# Patient Record
Sex: FEMALE | Race: WHITE | NOT HISPANIC OR LATINO | Employment: FULL TIME | URBAN - METROPOLITAN AREA
[De-identification: names, ages, dates, MRNs, and addresses within clinical notes are randomized per-mention and may not be internally consistent; named-entity substitution may affect disease eponyms.]

---

## 2023-07-20 ENCOUNTER — TELEPHONE (OUTPATIENT)
Dept: OBSTETRICS AND GYNECOLOGY | Facility: CLINIC | Age: 28
End: 2023-07-20

## 2023-07-20 NOTE — TELEPHONE ENCOUNTER
Caller: Ghazala Bravo    Relationship to patient: Self    Best call back number: [phone]    Patient is needing: PATIENT IS REQUESTING TO BE SCHEDULED FOR ANOTHER PAP SMEAR. LAST AE: 07.12.23. PATIENT WOULD LIKE A CALL BACK FROM NURSE OR DR. CAMPUZANO.

## 2023-11-29 ENCOUNTER — INITIAL PRENATAL (OUTPATIENT)
Dept: OBSTETRICS AND GYNECOLOGY | Facility: CLINIC | Age: 28
End: 2023-11-29
Payer: MEDICAID

## 2023-11-29 VITALS — SYSTOLIC BLOOD PRESSURE: 100 MMHG | DIASTOLIC BLOOD PRESSURE: 65 MMHG | WEIGHT: 127 LBS | BODY MASS INDEX: 21.8 KG/M2

## 2023-11-29 DIAGNOSIS — Z34.90 PREGNANCY WITH UNCERTAIN DATES, ANTEPARTUM: ICD-10-CM

## 2023-11-29 DIAGNOSIS — Z13.89 SCREENING FOR BLOOD OR PROTEIN IN URINE: ICD-10-CM

## 2023-11-29 DIAGNOSIS — Z34.90 EARLY STAGE OF PREGNANCY: Primary | ICD-10-CM

## 2023-11-29 LAB
GLUCOSE UR STRIP-MCNC: NEGATIVE MG/DL
PROT UR STRIP-MCNC: NEGATIVE MG/DL

## 2023-11-29 RX ORDER — PRENATAL VIT/IRON FUM/FOLIC AC 27MG-0.8MG
TABLET ORAL DAILY
COMMUNITY

## 2023-11-29 NOTE — PROGRESS NOTES
Chief Complaint   Patient presents with    Initial Prenatal Visit     HPI- Pt is 28 y.o.  at 8w1d here for prenatal visit.  Patient presents for initial OB visit.  She states she is sure regarding her LMP.  She has had 2 previous pregnancies.  One was a chemical pregnancy, and the other was a normal pregnancy with no complications, and she had a term vaginal delivery.  She is taking prenatal vitamins and has no complaints today.    ROS-     - No vaginal bleeding    GI- No abdominal pain    /65   Wt 57.6 kg (127 lb)   LMP 10/03/2023   BMI 21.80 kg/m²   Exam - See flow sheet    Fetal heart rate is normal    Assessment-  Diagnoses and all orders for this visit:    Early stage of pregnancy  -     OB Panel With HIV  -     Urine Culture - Urine, Urine, Clean Catch  -     Chlamydia trachomatis, Neisseria gonorrhoeae, Trichomonas vaginalis, PCR - Swab, Vagina    Screening for blood or protein in urine  -     POC Urinalysis Dipstick    Pregnancy with uncertain dates, antepartum  -     US Ob Transvaginal; Future    Other orders  -     Prenatal Vit-Fe Fumarate-FA (prenatal vitamin 27-0.8) 27-0.8 MG tablet tablet; Take  by mouth Daily.      Initial OB counseling was done.  OB labs and ultrasound were ordered.  She will follow-up for next OB visit in 4 weeks.

## 2023-11-30 LAB
ABO GROUP BLD: ABNORMAL
BASOPHILS # BLD AUTO: 0.1 X10E3/UL (ref 0–0.2)
BASOPHILS NFR BLD AUTO: 1 %
BLD GP AB SCN SERPL QL: NEGATIVE
EOSINOPHIL # BLD AUTO: 0.5 X10E3/UL (ref 0–0.4)
EOSINOPHIL NFR BLD AUTO: 3 %
ERYTHROCYTE [DISTWIDTH] IN BLOOD BY AUTOMATED COUNT: 19.5 % (ref 11.7–15.4)
HBV SURFACE AG SERPL QL IA: NEGATIVE
HCT VFR BLD AUTO: 26.9 % (ref 34–46.6)
HCV IGG SERPL QL IA: NON REACTIVE
HGB BLD-MCNC: 9.6 G/DL (ref 11.1–15.9)
HIV 1+2 AB+HIV1 P24 AG SERPL QL IA: NON REACTIVE
IMM GRANULOCYTES # BLD AUTO: 0.1 X10E3/UL (ref 0–0.1)
IMM GRANULOCYTES NFR BLD AUTO: 1 %
LYMPHOCYTES # BLD AUTO: 3.5 X10E3/UL (ref 0.7–3.1)
LYMPHOCYTES NFR BLD AUTO: 23 %
MCH RBC QN AUTO: 34.3 PG (ref 26.6–33)
MCHC RBC AUTO-ENTMCNC: 35.7 G/DL (ref 31.5–35.7)
MCV RBC AUTO: 96 FL (ref 79–97)
MONOCYTES # BLD AUTO: 0.6 X10E3/UL (ref 0.1–0.9)
MONOCYTES NFR BLD AUTO: 4 %
NEUTROPHILS # BLD AUTO: 10 X10E3/UL (ref 1.4–7)
NEUTROPHILS NFR BLD AUTO: 68 %
PLATELET # BLD AUTO: 399 X10E3/UL (ref 150–450)
RBC # BLD AUTO: 2.8 X10E6/UL (ref 3.77–5.28)
RH BLD: POSITIVE
RPR SER QL: NON REACTIVE
RUBV IGG SERPL IA-ACNC: 2.98 INDEX
WBC # BLD AUTO: 14.8 X10E3/UL (ref 3.4–10.8)

## 2023-12-01 PROBLEM — O99.019 MATERNAL ANEMIA IN PREGNANCY, ANTEPARTUM: Status: ACTIVE | Noted: 2023-12-01

## 2023-12-01 LAB
BACTERIA UR CULT: NORMAL
BACTERIA UR CULT: NORMAL
C TRACH RRNA SPEC QL NAA+PROBE: NEGATIVE
N GONORRHOEA RRNA SPEC QL NAA+PROBE: NEGATIVE
T VAGINALIS RRNA SPEC QL NAA+PROBE: NEGATIVE

## 2023-12-01 RX ORDER — FERROUS SULFATE 325(65) MG
325 TABLET ORAL
Qty: 90 TABLET | Refills: 1 | Status: SHIPPED | OUTPATIENT
Start: 2023-12-01

## 2023-12-23 NOTE — PROGRESS NOTES
OB VISIT 12 WEEKS      No chief complaint on file.        Ghazala Bravo is a 28 y.o.  12w0d being seen today for her obstetrical visit.  Patient reports no complaints. Fetal movement: normal.      REVIEW OF SYSTEMS  Review of Systems   All other systems reviewed and are negative.    Cramping/contractions: denies  Vaginal bleeding: denies  Nausea and vomiting: no nausea and no vomiting    /67   Wt 57.2 kg (126 lb)   LMP 10/03/2023   BMI 21.63 kg/m²     Physical Exam  Constitutional:       General: She is awake.      Appearance: Normal appearance. She is well-developed, well-groomed and normal weight.   HENT:      Head: Normocephalic and atraumatic.   Pulmonary:      Effort: Pulmonary effort is normal.   Musculoskeletal:      Cervical back: Normal range of motion.   Neurological:      General: No focal deficit present.      Mental Status: She is alert and oriented to person, place, and time.   Skin:     General: Skin is warm and dry.   Psychiatric:         Mood and Affect: Mood normal.         Behavior: Behavior normal. Behavior is cooperative.   Vitals reviewed.         ASSESSMENT/PLAN    Diagnoses and all orders for this visit:    1. First trimester pregnancy (Primary)  -     [id number] PLUS Core (chr21,18,13,sex) - Blood,  -     POC Urinalysis Dipstick    2. 12 weeks gestation of pregnancy  -     [id number] PLUS Core (chr21,18,13,sex) - Blood,  -     POC Urinalysis Dipstick  -     US Ob Limited 1 + Fetuses    3. Maternal anemia in pregnancy, antepartum    4. Low back pain during pregnancy, first trimester       Recommended support band for low back pain, physical therapy referral placed to evaluate and treat.   Genetic screen today.   Reviewed this stage of pregnancy.  Problem list updated.     Follow up in 4 weeks with Dr. Anderson.     I spent 15 minutes caring for Ghazala on this date of service. This time includes time spent by me in the following activities: preparing for the visit, reviewing  tests, obtaining and/or reviewing a separately obtained history, performing a medically appropriate examination and/or evaluation, counseling and educating the patient/family/caregiver, ordering medications, tests, or procedures, referring and communicating with other health care professionals, documenting information in the medical record, and care coordination      Franny Duckworth CNM  12/26/2023  13:26 EST

## 2023-12-26 ENCOUNTER — ROUTINE PRENATAL (OUTPATIENT)
Dept: OBSTETRICS AND GYNECOLOGY | Facility: CLINIC | Age: 28
End: 2023-12-26
Payer: MEDICAID

## 2023-12-26 VITALS — SYSTOLIC BLOOD PRESSURE: 115 MMHG | DIASTOLIC BLOOD PRESSURE: 67 MMHG | BODY MASS INDEX: 21.63 KG/M2 | WEIGHT: 126 LBS

## 2023-12-26 DIAGNOSIS — Z34.91 FIRST TRIMESTER PREGNANCY: Primary | ICD-10-CM

## 2023-12-26 DIAGNOSIS — O26.891 LOW BACK PAIN DURING PREGNANCY, FIRST TRIMESTER: ICD-10-CM

## 2023-12-26 DIAGNOSIS — Z3A.12 12 WEEKS GESTATION OF PREGNANCY: ICD-10-CM

## 2023-12-26 DIAGNOSIS — M54.50 LOW BACK PAIN DURING PREGNANCY, FIRST TRIMESTER: ICD-10-CM

## 2023-12-26 DIAGNOSIS — O99.019 MATERNAL ANEMIA IN PREGNANCY, ANTEPARTUM: ICD-10-CM

## 2023-12-26 LAB
GLUCOSE UR STRIP-MCNC: NEGATIVE MG/DL
PROT UR STRIP-MCNC: NEGATIVE MG/DL

## 2023-12-31 LAB
CFDNA.FET/CFDNA.TOTAL SFR FETUS: NORMAL %
CITATION REF LAB TEST: NORMAL
FET 13+18+21+X+Y ANEUP PLAS.CFDNA: NEGATIVE
FET CHR 21 TS PLAS.CFDNA QL: NEGATIVE
FET SEX PLAS.CFDNA DOSAGE CFDNA: NORMAL
FET TS 13 RISK PLAS.CFDNA QL: NEGATIVE
FET TS 18 RISK WBC.DNA+CFDNA QL: NEGATIVE
GA EST FROM CONCEPTION DATE: NORMAL D
GESTATIONAL AGE > 9:: YES
LAB DIRECTOR NAME PROVIDER: NORMAL
LAB DIRECTOR NAME PROVIDER: NORMAL
LABORATORY COMMENT REPORT: NORMAL
LIMITATIONS OF THE TEST: NORMAL
NEGATIVE PREDICTIVE VALUE: NORMAL
NOTE: NORMAL
PERFORMANCE CHARACTERISTICS: NORMAL
POSITIVE PREDICTIVE VALUE: NORMAL
REF LAB TEST METHOD: NORMAL
TEST PERFORMANCE INFO SPEC: NORMAL

## 2024-01-03 ENCOUNTER — TELEPHONE (OUTPATIENT)
Dept: OBSTETRICS AND GYNECOLOGY | Facility: CLINIC | Age: 29
End: 2024-01-03
Payer: MEDICAID

## 2024-01-03 NOTE — TELEPHONE ENCOUNTER
Please let patient know that her genetic testing was normal, and if she wants to know gender, it showed a female fetus

## 2024-01-26 ENCOUNTER — ROUTINE PRENATAL (OUTPATIENT)
Dept: OBSTETRICS AND GYNECOLOGY | Facility: CLINIC | Age: 29
End: 2024-01-26
Payer: MEDICAID

## 2024-01-26 VITALS — WEIGHT: 132 LBS | DIASTOLIC BLOOD PRESSURE: 65 MMHG | BODY MASS INDEX: 22.66 KG/M2 | SYSTOLIC BLOOD PRESSURE: 105 MMHG

## 2024-01-26 DIAGNOSIS — Z36.89 ENCOUNTER FOR FETAL ANATOMIC SURVEY: ICD-10-CM

## 2024-01-26 DIAGNOSIS — Z13.89 SCREENING FOR BLOOD OR PROTEIN IN URINE: ICD-10-CM

## 2024-01-26 DIAGNOSIS — Z3A.16 16 WEEKS GESTATION OF PREGNANCY: ICD-10-CM

## 2024-01-26 DIAGNOSIS — O99.019 MATERNAL ANEMIA IN PREGNANCY, ANTEPARTUM: Primary | ICD-10-CM

## 2024-01-26 DIAGNOSIS — Z36.86 ENCOUNTER FOR ANTENATAL SCREENING FOR CERVICAL LENGTH: ICD-10-CM

## 2024-01-26 LAB
GLUCOSE UR STRIP-MCNC: NEGATIVE MG/DL
PROT UR STRIP-MCNC: NEGATIVE MG/DL

## 2024-01-26 RX ORDER — ONDANSETRON 4 MG/1
4 TABLET, ORALLY DISINTEGRATING ORAL EVERY 8 HOURS PRN
Qty: 20 TABLET | Refills: 2 | Status: SHIPPED | OUTPATIENT
Start: 2024-01-26

## 2024-01-26 NOTE — PROGRESS NOTES
Chief Complaint   Patient presents with    Routine Prenatal Visit     HPI- Pt is 28 y.o.  at 16w3d here for prenatal visit.  She reports nausea after she takes her iron pills.  Otherwise, she has been doing well.       ROS-     - No vaginal bleeding    GI- No abdominal pain    /65   Wt 59.9 kg (132 lb)   LMP 10/03/2023   BMI 22.66 kg/m²   Exam - See flow sheet    Fetal heart rate is normal    Assessment-  Diagnoses and all orders for this visit:    Maternal anemia in pregnancy, antepartum    16 weeks gestation of pregnancy    Screening for blood or protein in urine  -     POC Urinalysis Dipstick    Encounter for fetal anatomic survey  -     US Ob 14 + Weeks Single or First Gestation; Future    Encounter for  screening for cervical length  -     US Ob Transvaginal; Future    Other orders  -     ondansetron ODT (ZOFRAN-ODT) 4 MG disintegrating tablet; Place 1 tablet on the tongue Every 8 (Eight) Hours As Needed for Nausea or Vomiting.    She had normal cell free DNA testing.  She is taking her prenatal vitamin and iron pills at the same time and I have advised her to take them at separate times during the day to see if this helps with nausea.  Prescription was also sent for Zofran.  She was offered screening for spina bifida and declines.  She will follow-up in 4 weeks with anatomy ultrasound.

## 2024-02-23 ENCOUNTER — ROUTINE PRENATAL (OUTPATIENT)
Dept: OBSTETRICS AND GYNECOLOGY | Facility: CLINIC | Age: 29
End: 2024-02-23
Payer: MEDICAID

## 2024-02-23 VITALS — WEIGHT: 140 LBS | SYSTOLIC BLOOD PRESSURE: 101 MMHG | BODY MASS INDEX: 24.03 KG/M2 | DIASTOLIC BLOOD PRESSURE: 61 MMHG

## 2024-02-23 DIAGNOSIS — Z3A.20 20 WEEKS GESTATION OF PREGNANCY: ICD-10-CM

## 2024-02-23 DIAGNOSIS — Z13.89 SCREENING FOR BLOOD OR PROTEIN IN URINE: ICD-10-CM

## 2024-02-23 DIAGNOSIS — O44.20 MARGINAL PLACENTA PREVIA: Primary | ICD-10-CM

## 2024-02-23 PROBLEM — Z34.90 PREGNANT: Status: RESOLVED | Noted: 2019-02-18 | Resolved: 2024-02-23

## 2024-02-23 PROBLEM — Z37.9 NORMAL LABOR: Status: RESOLVED | Noted: 2019-02-20 | Resolved: 2024-02-23

## 2024-02-23 LAB
GLUCOSE UR STRIP-MCNC: NEGATIVE MG/DL
PROT UR STRIP-MCNC: NEGATIVE MG/DL

## 2024-02-23 NOTE — PROGRESS NOTES
Chief Complaint   Patient presents with    Routine Prenatal Visit     HPI- Pt is 28 y.o.  at 20w3d here for prenatal visit.  Patient overall is doing well.  She does report discomfort in her pelvis and lower abdomen after increase in activity and states it resolves after rest.  She did do 1 session of PT for sciatic pain and states that it helped.  She is feeling fetal movement.    ROS-     - No vaginal bleeding    GI- No abdominal pain    /61   Wt 63.5 kg (140 lb)   LMP 10/03/2023   BMI 24.03 kg/m²   Exam - See flow sheet    Fetal heart rate is normal    Assessment-  Diagnoses and all orders for this visit:    Marginal placenta previa  -     US Ob Transvaginal; Future    20 weeks gestation of pregnancy    Anatomy ultrasound shows normal-appearing fetal anatomy.  She has noted to have an anterior marginal previa and this finding was discussed with her and her  in detail.  I do advise pelvic rest until she is seen back in 4 weeks and we will repeat an ultrasound to see if the previa is still present.

## 2024-03-22 ENCOUNTER — ROUTINE PRENATAL (OUTPATIENT)
Dept: OBSTETRICS AND GYNECOLOGY | Facility: CLINIC | Age: 29
End: 2024-03-22
Payer: MEDICAID

## 2024-03-22 VITALS — SYSTOLIC BLOOD PRESSURE: 104 MMHG | BODY MASS INDEX: 24.37 KG/M2 | WEIGHT: 142 LBS | DIASTOLIC BLOOD PRESSURE: 64 MMHG

## 2024-03-22 DIAGNOSIS — Z13.89 SCREENING FOR BLOOD OR PROTEIN IN URINE: ICD-10-CM

## 2024-03-22 DIAGNOSIS — Z3A.24 24 WEEKS GESTATION OF PREGNANCY: ICD-10-CM

## 2024-03-22 DIAGNOSIS — O99.019 MATERNAL ANEMIA IN PREGNANCY, ANTEPARTUM: Primary | ICD-10-CM

## 2024-03-22 DIAGNOSIS — Z13.0 SCREENING FOR IRON DEFICIENCY ANEMIA: ICD-10-CM

## 2024-03-22 DIAGNOSIS — Z11.3 SCREEN FOR STD (SEXUALLY TRANSMITTED DISEASE): ICD-10-CM

## 2024-03-22 DIAGNOSIS — Z13.1 SCREENING FOR DIABETES MELLITUS: ICD-10-CM

## 2024-03-22 PROBLEM — O44.20 MARGINAL PLACENTA PREVIA: Status: RESOLVED | Noted: 2024-02-23 | Resolved: 2024-03-22

## 2024-03-22 LAB
GLUCOSE UR STRIP-MCNC: NEGATIVE MG/DL
PROT UR STRIP-MCNC: NEGATIVE MG/DL

## 2024-03-22 NOTE — PROGRESS NOTES
Chief Complaint   Patient presents with    Routine Prenatal Visit     HPI- Pt is 28 y.o.  at 24w3d here for prenatal visit.  She is doing well and has no complaints.  She is feeling good fetal movement.    ROS-     - No vaginal bleeding    GI- No abdominal pain    /64   Wt 64.4 kg (142 lb)   LMP 10/03/2023   BMI 24.37 kg/m²   Exam - See flow sheet    Fetal heart rate is normal    Assessment-  Diagnoses and all orders for this visit:    Maternal anemia in pregnancy, antepartum    Screening for blood or protein in urine  -     POC Urinalysis Dipstick    Screening for diabetes mellitus  -     Gestational Screen 1 Hr ([facility])    Screening for iron deficiency anemia  -     CBC (No Diff)    Screen for STD (sexually transmitted disease)  -     T Pallidum Antibody w/ reflex RPR (Syphilis)    24 weeks gestation of pregnancy      Repeat ultrasound today no longer shows marginal placenta previa.  Ultrasound was reviewed with her.  Growth is also appropriate on ultrasound.  I discussed the glucose test with her next visit and instructions were given.  She will follow-up in 4 weeks.

## 2024-04-22 RX ORDER — FERROUS SULFATE 325(65) MG
325 TABLET ORAL
Qty: 90 TABLET | Refills: 1 | Status: SHIPPED | OUTPATIENT
Start: 2024-04-22

## 2024-04-24 ENCOUNTER — ROUTINE PRENATAL (OUTPATIENT)
Dept: OBSTETRICS AND GYNECOLOGY | Facility: CLINIC | Age: 29
End: 2024-04-24
Payer: MEDICAID

## 2024-04-24 VITALS — BODY MASS INDEX: 25.58 KG/M2 | WEIGHT: 149 LBS | DIASTOLIC BLOOD PRESSURE: 61 MMHG | SYSTOLIC BLOOD PRESSURE: 99 MMHG

## 2024-04-24 DIAGNOSIS — Z3A.29 29 WEEKS GESTATION OF PREGNANCY: ICD-10-CM

## 2024-04-24 DIAGNOSIS — O99.019 MATERNAL ANEMIA IN PREGNANCY, ANTEPARTUM: Primary | ICD-10-CM

## 2024-04-24 PROCEDURE — 99213 OFFICE O/P EST LOW 20 MIN: CPT | Performed by: OBSTETRICS & GYNECOLOGY

## 2024-04-24 NOTE — PROGRESS NOTES
Chief Complaint   Patient presents with    Routine Prenatal Visit     HPI- Pt is 28 y.o.  at 29w1d here for prenatal visit.  She is doing well today with no complaints.  She does report good fetal movement.    ROS-     - No vaginal bleeding    GI- No abdominal pain    BP 99/61   Wt 67.6 kg (149 lb)   LMP 10/03/2023   BMI 25.58 kg/m²   Exam - See flow sheet    Fetal heart rate is normal    Assessment-  Diagnoses and all orders for this visit:    Maternal anemia in pregnancy, antepartum    29 weeks gestation of pregnancy    Patient is doing her glucose test today.  We are also rechecking CBC to reevaluate anemia.  She will follow-up for next prenatal visit in 2 weeks.

## 2024-04-25 LAB
ERYTHROCYTE [DISTWIDTH] IN BLOOD BY AUTOMATED COUNT: 21.6 % (ref 11.7–15.4)
GLUCOSE 1H P 50 G GLC PO SERPL-MCNC: 118 MG/DL (ref 70–139)
HCT VFR BLD AUTO: 30.7 % (ref 34–46.6)
HGB BLD-MCNC: 10.3 G/DL (ref 11.1–15.9)
MCH RBC QN AUTO: 32.7 PG (ref 26.6–33)
MCHC RBC AUTO-ENTMCNC: 33.6 G/DL (ref 31.5–35.7)
MCV RBC AUTO: 98 FL (ref 79–97)
PLATELET # BLD AUTO: 383 X10E3/UL (ref 150–450)
RBC # BLD AUTO: 3.15 X10E6/UL (ref 3.77–5.28)
T PALLIDUM AB SER QL IF: NON REACTIVE
WBC # BLD AUTO: 19.2 X10E3/UL (ref 3.4–10.8)

## 2024-05-08 ENCOUNTER — ROUTINE PRENATAL (OUTPATIENT)
Dept: OBSTETRICS AND GYNECOLOGY | Facility: CLINIC | Age: 29
End: 2024-05-08
Payer: MEDICAID

## 2024-05-08 VITALS — WEIGHT: 153 LBS | BODY MASS INDEX: 26.26 KG/M2 | DIASTOLIC BLOOD PRESSURE: 65 MMHG | SYSTOLIC BLOOD PRESSURE: 101 MMHG

## 2024-05-08 DIAGNOSIS — Z3A.31 31 WEEKS GESTATION OF PREGNANCY: ICD-10-CM

## 2024-05-08 DIAGNOSIS — O99.019 MATERNAL ANEMIA IN PREGNANCY, ANTEPARTUM: Primary | ICD-10-CM

## 2024-05-08 DIAGNOSIS — Z13.89 SCREENING FOR BLOOD OR PROTEIN IN URINE: ICD-10-CM

## 2024-05-08 LAB
GLUCOSE UR STRIP-MCNC: NEGATIVE MG/DL
PROT UR STRIP-MCNC: NEGATIVE MG/DL

## 2024-05-22 ENCOUNTER — ROUTINE PRENATAL (OUTPATIENT)
Dept: OBSTETRICS AND GYNECOLOGY | Facility: CLINIC | Age: 29
End: 2024-05-22
Payer: MEDICAID

## 2024-05-22 VITALS — SYSTOLIC BLOOD PRESSURE: 110 MMHG | WEIGHT: 152.2 LBS | BODY MASS INDEX: 26.13 KG/M2 | DIASTOLIC BLOOD PRESSURE: 74 MMHG

## 2024-05-22 DIAGNOSIS — Z3A.33 33 WEEKS GESTATION OF PREGNANCY: ICD-10-CM

## 2024-05-22 DIAGNOSIS — O99.019 MATERNAL ANEMIA IN PREGNANCY, ANTEPARTUM: Primary | ICD-10-CM

## 2024-05-22 DIAGNOSIS — Z23 NEED FOR TDAP VACCINATION: ICD-10-CM

## 2024-05-22 NOTE — PROGRESS NOTES
Chief Complaint   Patient presents with    Routine Prenatal Visit     HPI- Pt is 29 y.o.  at 33w1d here for prenatal visit.  She is doing well and has no major complaints.  She does report good fetal movement.    ROS-     - No vaginal bleeding    GI- No abdominal pain    /74   Wt 69 kg (152 lb 3.2 oz)   LMP 10/03/2023   BMI 26.13 kg/m²   Exam - See flow sheet    Fetal heart rate is normal    Assessment-  Diagnoses and all orders for this visit:    Maternal anemia in pregnancy, antepartum    Breech presentation, single or unspecified fetus  -     US Ob Follow Up Transabdominal Approach; Future    33 weeks gestation of pregnancy    Need for Tdap vaccination    Growth ultrasound today shows appropriate fetal growth.  Baby is in the breech presentation and I recommend rechecking position in 3 weeks.  I did discuss with patient the possibility for an ECV at 37 weeks if she desires.  She was counseled that if baby remains in the breech presentation, she will need a  for delivery.  She does want to receive Tdap and will receive the injection today.

## 2024-06-12 ENCOUNTER — ROUTINE PRENATAL (OUTPATIENT)
Dept: OBSTETRICS AND GYNECOLOGY | Facility: CLINIC | Age: 29
End: 2024-06-12

## 2024-06-12 VITALS — SYSTOLIC BLOOD PRESSURE: 110 MMHG | DIASTOLIC BLOOD PRESSURE: 65 MMHG | WEIGHT: 157 LBS | BODY MASS INDEX: 26.95 KG/M2

## 2024-06-12 DIAGNOSIS — Z36.85 ANTENATAL SCREENING FOR STREPTOCOCCUS B: ICD-10-CM

## 2024-06-12 DIAGNOSIS — O99.019 MATERNAL ANEMIA IN PREGNANCY, ANTEPARTUM: ICD-10-CM

## 2024-06-12 DIAGNOSIS — O99.891 BACK PAIN AFFECTING PREGNANCY IN THIRD TRIMESTER: ICD-10-CM

## 2024-06-12 DIAGNOSIS — Z3A.36 36 WEEKS GESTATION OF PREGNANCY: ICD-10-CM

## 2024-06-12 DIAGNOSIS — M54.9 BACK PAIN AFFECTING PREGNANCY IN THIRD TRIMESTER: ICD-10-CM

## 2024-06-12 NOTE — PROGRESS NOTES
Chief Complaint   Patient presents with    Routine Prenatal Visit     HPI- Pt is 29 y.o.  at 36w1d here for prenatal visit.  Patient reports pain on the right side of her abdomen that she initially noticed last night and comes and goes and worsens with certain position changes.  She denies any history of kidney stones.  She does report good fetal movement.    ROS-     - No vaginal bleeding    GI- No abdominal pain    /65   Wt 71.2 kg (157 lb)   LMP 10/03/2023   BMI 26.95 kg/m²   Exam - See flow sheet    Fetal heart rate is normal    Assessment-  Diagnoses and all orders for this visit:    Breech presentation, single or unspecified fetus  -     Case Request    Maternal anemia in pregnancy, antepartum    36 weeks gestation of pregnancy     screening for streptococcus B  -     Strep Grp B ALBINO + Reflex - Swab, Vaginal/Rectum    Back pain affecting pregnancy in third trimester  -     Urine Culture - Urine, Urine, Clean Catch    Patient reports that her right-sided pain improves when she is sitting and certain positions.  She does not have any CVA tenderness and does not appear clinically to have a kidney stone.  Urine has been sent for culture.  I recommend heat and Tylenol for her pain and if pain is worsening and unbearable or she has any fevers, she was advised to go to labor and delivery.  Reviewed ultrasound with her and the baby is in the breech presentation.  I did offer her an external cephalic version and she declines.  She would like to schedule a primary  and if the baby flipped to vertex, I discussed that we can cancel her .  GBS culture was obtained.  She does have a penicillin allergy but can tolerate cephalosporins.  She will follow-up weekly.

## 2024-06-15 LAB
BACTERIA UR CULT: NO GROWTH
BACTERIA UR CULT: NORMAL
GP B STREP DNA SPEC QL NAA+PROBE: NEGATIVE

## 2024-06-19 ENCOUNTER — ROUTINE PRENATAL (OUTPATIENT)
Dept: OBSTETRICS AND GYNECOLOGY | Facility: CLINIC | Age: 29
End: 2024-06-19
Payer: MEDICAID

## 2024-06-19 VITALS — BODY MASS INDEX: 27.12 KG/M2 | WEIGHT: 158 LBS | SYSTOLIC BLOOD PRESSURE: 101 MMHG | DIASTOLIC BLOOD PRESSURE: 63 MMHG

## 2024-06-19 DIAGNOSIS — Z3A.37 37 WEEKS GESTATION OF PREGNANCY: ICD-10-CM

## 2024-06-19 DIAGNOSIS — O99.019 MATERNAL ANEMIA IN PREGNANCY, ANTEPARTUM: ICD-10-CM

## 2024-06-19 DIAGNOSIS — Z13.89 SCREENING FOR BLOOD OR PROTEIN IN URINE: ICD-10-CM

## 2024-06-19 LAB
GLUCOSE UR STRIP-MCNC: NEGATIVE MG/DL
PROT UR STRIP-MCNC: ABNORMAL MG/DL

## 2024-06-19 NOTE — PROGRESS NOTES
Chief Complaint   Patient presents with    Routine Prenatal Visit     HPI- Pt is 29 y.o.  at 37w1d here for prenatal visit.  She is noticing Yogi Brooke contractions, but overall feels well.  She reports good fetal movement.    ROS-     - No vaginal bleeding    GI- No abdominal pain    /63   Wt 71.7 kg (158 lb)   LMP 10/03/2023   BMI 27.12 kg/m²   Exam - See flow sheet    Fetal heart rate is normal    Assessment-  Diagnoses and all orders for this visit:    Breech presentation, single or unspecified fetus    Screening for blood or protein in urine  -     POC Urinalysis Dipstick    Maternal anemia in pregnancy, antepartum    37 weeks gestation of pregnancy    V-scan ultrasound done at the bedside does show that the baby is still in the breech presentation.  She is scheduled for a primary  in 2 weeks and she was given ERAS instructions today.  She does understand to go to labor and delivery with any signs of labor or rupture of membranes.  She will follow-up in 1 week.

## 2024-06-22 ENCOUNTER — TELEPHONE (OUTPATIENT)
Dept: OBSTETRICS AND GYNECOLOGY | Facility: CLINIC | Age: 29
End: 2024-06-22
Payer: MEDICAID

## 2024-06-22 ENCOUNTER — HOSPITAL ENCOUNTER (EMERGENCY)
Facility: HOSPITAL | Age: 29
Discharge: HOME | End: 2024-06-22
Attending: OBSTETRICS & GYNECOLOGY | Admitting: OBSTETRICS & GYNECOLOGY
Payer: MEDICAID

## 2024-06-22 VITALS
HEIGHT: 64 IN | TEMPERATURE: 98.8 F | DIASTOLIC BLOOD PRESSURE: 66 MMHG | HEART RATE: 92 BPM | WEIGHT: 162 LBS | RESPIRATION RATE: 16 BRPM | SYSTOLIC BLOOD PRESSURE: 121 MMHG | BODY MASS INDEX: 27.66 KG/M2

## 2024-06-22 LAB
BILIRUB UR QL STRIP: NEGATIVE
CLARITY UR: CLEAR
COLOR UR: ABNORMAL
GLUCOSE UR STRIP-MCNC: NEGATIVE MG/DL
HGB UR QL STRIP.AUTO: NEGATIVE
KETONES UR QL STRIP: NEGATIVE
LEUKOCYTE ESTERASE UR QL STRIP.AUTO: NEGATIVE
NITRITE UR QL STRIP: NEGATIVE
PH UR STRIP.AUTO: 7 [PH] (ref 5–8)
PROT UR QL STRIP: NEGATIVE
SP GR UR STRIP: 1.01 (ref 1–1.03)
UROBILINOGEN UR QL STRIP: ABNORMAL

## 2024-06-22 PROCEDURE — 59025 FETAL NON-STRESS TEST: CPT

## 2024-06-22 PROCEDURE — 81003 URINALYSIS AUTO W/O SCOPE: CPT | Performed by: OBSTETRICS & GYNECOLOGY

## 2024-06-22 PROCEDURE — 99284 EMERGENCY DEPT VISIT MOD MDM: CPT | Performed by: OBSTETRICS & GYNECOLOGY

## 2024-06-22 NOTE — TELEPHONE ENCOUNTER
Patient called the answering service with a sudden onset of right-sided back and flank pain.  She says she had had a similar pain about a week ago that resolved spontaneously.    Patient reports that tonight she awoken from sleep and had gone to the bathroom and noticed a sudden sharp pain in the right side of her back that radiated around to the flank on the right side.  She says the pain initially was about a 3/10 in intensity but had episodic flares up to about an 8/10 in intensity.  She says at no time does the pain resolve completely.    She says it does seem to be worse with certain types of movements.  She also notes when she stands on her right side the pain seems to be more intense.    Patient was noted to be breech at the time of the most recent ultrasound a few days ago.  She is scheduled for primary  for breech.  She does have a history of a prior vaginal delivery.    I explained to the patient that the nature of her pain being unilateral into the back and flank and with exacerbation with movement to me would indicate either some sort of musculoskeletal source such as a muscle strain of the back, sciatic pain, or kidney stone.    Explained that the description of her pain does not sound typical of labor; however, I would recommend that she come to the [facility] labor and delivery for evaluation to be sure this is not related to labor, and additionally to try to identify and treat the source of her pain.

## 2024-06-22 NOTE — OBED NOTES
[facility]  Ghazala Stroud  : 1995  MRN: [id number]  CSN: [id number]    OB ED Provider Note    Subjective   Chief Complaint   Patient presents with    Back Pain     [facility] - Patient presents to [facility] with c/o right side flank/back pain that radiates to abdomen and pelvis. Denies any bleeding, leaking or trauma to abdomen.     Ghazala Stroud is a 29 y.o. year old  with an Estimated Date of Delivery: 24 currently at 37w4d presenting with acute onset of constant right sided abdominal pain that is located along the mid axillary line that was observed after taking a long nap on her right side. She denies CTX, ROM or VB. FM is present. No dysuria, N/V, F/C.    Prenatal care has been with Dr. Anderson.  It has been complicated by persistent breech .    OB History    Para Term  AB Living   3 1 1 0 1 1   SAB IAB Ectopic Molar Multiple Live Births   1 0 0 0 0 1      # Outcome Date GA Lbr Jean-Pierre/2nd Weight Sex Type Anes PTL Lv   3 Current            2 Term 19 39w3d 12:21 / 01:09 3305 g (7 lb 4.6 oz) M Vag-Spont EPI N LISA      Birth Comments: scale 1      Complications: Fetal Intolerance, Temperature elevated      Name: HECTOR GARSIA      Apgar1: 9  Apgar5: 9   1 SAB               Obstetric Comments   ANA 19.      Past Medical History:   Diagnosis Date    Fifth disease     Abnormal Pap smear of cervix     Anemia     Asthma     Chlamydia     Hereditary spherocytosis     History of blood transfusion     History of mononucleosis     History of transfusion     age 7 or 8    HPV (human papilloma virus) infection     Jaundice     Spherocytosis, hereditary      Past Surgical History:   Procedure Laterality Date    CHOLECYSTECTOMY       No current facility-administered medications for this encounter.    Allergies   Allergen Reactions    Erythromycin Palpitations     FROM CHILDHOOD    Amoxil [Amoxicillin] Rash    Loratadine Rash    Penicillin G Rash    Sudafed [Pseudoephedrine Hcl]  "Palpitations     Social History    Tobacco Use      Smoking status: Never      Smokeless tobacco: Never    Review of Systems   Musculoskeletal:  Positive for back pain.   All other systems reviewed and are negative.        Objective   /66 (BP Location: Right arm, Patient Position: Sitting)   Pulse 92   Temp 98.8 °F (37.1 °C) (Oral)   Resp 16   Ht 162.6 cm (64\")   Wt 73.5 kg (162 lb)   LMP 10/03/2023   BMI 27.81 kg/m²   General: well developed; well nourished  no acute distress   Abdomen: soft, non-tender; no masses  gravid    FHT's: reactive and category 1      Cervix: was checked (by RN): 1 cm / 30 % / -3   Presentation: Breech, fetal vtx to maternal right   Contractions: irregular   Chest: Unlabored respirations    CV:  RRR   Ext:   No C/C/E   Back: CVA tenderness is absent bilateral. Pain is present along midaxillary line from iliac crest to 4 cm below costal margin       Prenatal Labs  Lab Results   Component Value Date    HGB 10.3 (L) 04/24/2024    RUBELLAABIGG 2.98 11/29/2023    HEPBSAG Negative 11/29/2023    ABORH O Positive 06/24/2022    ABSCRN Negative 11/29/2023    FVB7ZFG5 Non Reactive 11/29/2023    HEPCVIRUSABY Non Reactive 11/29/2023     04/24/2024    STREPGPB Negative 06/12/2024    URINECX Final report 06/12/2024    CHLAMNAA Negative 11/29/2023    NGONORRHON Negative 11/29/2023       Current Labs Reviewed   UA:    Lab Results   Component Value Date    SQUAMEPIUA 3-6 (A) 07/19/2018    SPECGRAVUR 1.014 06/22/2024    KETONESU Negative 06/22/2024    BLOODU Negative 06/22/2024    LEUKOCYTESUR Negative 06/22/2024    NITRITEU Negative 06/22/2024    RBCUA 0-2 07/19/2018    WBCUA 6-12 (A) 07/19/2018    BACTERIA Trace 07/19/2018          Assessment   IUP at 37w4d  Right sided abdominal pain- location of pain and fetal vtx favor MSK source. The pain is too anterior for kidney related discomfort and too lateral for appendiceal or adnexal source.      Plan   D/C home with instructions to return " for worsening symptoms, acute changes.     Aleksey Clifford MD  6/22/2024  02:10 EDT

## 2024-06-25 ENCOUNTER — ROUTINE PRENATAL (OUTPATIENT)
Dept: OBSTETRICS AND GYNECOLOGY | Facility: CLINIC | Age: 29
End: 2024-06-25
Payer: MEDICAID

## 2024-06-25 VITALS — SYSTOLIC BLOOD PRESSURE: 120 MMHG | BODY MASS INDEX: 27.7 KG/M2 | DIASTOLIC BLOOD PRESSURE: 70 MMHG | WEIGHT: 161.4 LBS

## 2024-06-25 DIAGNOSIS — Z13.89 SCREENING FOR BLOOD OR PROTEIN IN URINE: ICD-10-CM

## 2024-06-25 DIAGNOSIS — O99.019 MATERNAL ANEMIA IN PREGNANCY, ANTEPARTUM: ICD-10-CM

## 2024-06-25 DIAGNOSIS — Z3A.38 38 WEEKS GESTATION OF PREGNANCY: ICD-10-CM

## 2024-06-25 LAB
GLUCOSE UR STRIP-MCNC: NEGATIVE MG/DL
PROT UR STRIP-MCNC: ABNORMAL MG/DL

## 2024-06-25 PROCEDURE — 99213 OFFICE O/P EST LOW 20 MIN: CPT | Performed by: OBSTETRICS & GYNECOLOGY

## 2024-06-25 NOTE — PROGRESS NOTES
Chief Complaint   Patient presents with    Routine Prenatal Visit     HPI- Pt is 29 y.o.  at 38w0d here for prenatal visit.  She is doing well today with no major complaints.  She did go to OB ED 3 days ago due to sudden onset of severe right-sided pain.  Her pain did resolve and it was felt that it was musculoskeletal.  She denies any severe pain today and reports good fetal movement.        ROS-     - No vaginal bleeding    GI- No abdominal pain    /70   Wt 73.2 kg (161 lb 6.4 oz)   LMP 10/03/2023   BMI 27.70 kg/m²   Exam - See flow sheet    Fetal heart rate is normal    Assessment-  Diagnoses and all orders for this visit:    Breech presentation, single or unspecified fetus    Maternal anemia in pregnancy, antepartum    38 weeks gestation of pregnancy    Screening for blood or protein in urine  -     POC Urinalysis Dipstick    V-scan ultrasound was done and baby remains in the breech presentation.  She is scheduled next Wednesday for  and she has received preoperative instructions.

## 2024-06-26 ENCOUNTER — TELEPHONE (OUTPATIENT)
Dept: OBSTETRICS AND GYNECOLOGY | Facility: CLINIC | Age: 29
End: 2024-06-26
Payer: MEDICAID

## 2024-07-03 ENCOUNTER — HOSPITAL ENCOUNTER (INPATIENT)
Facility: HOSPITAL | Age: 29
LOS: 2 days | Discharge: HOME | End: 2024-07-05
Attending: OBSTETRICS & GYNECOLOGY | Admitting: OBSTETRICS & GYNECOLOGY
Payer: MEDICAID

## 2024-07-03 ENCOUNTER — ANESTHESIA (OUTPATIENT)
Dept: LABOR AND DELIVERY | Facility: HOSPITAL | Age: 29
End: 2024-07-03
Payer: MEDICAID

## 2024-07-03 ENCOUNTER — ANESTHESIA EVENT (OUTPATIENT)
Dept: LABOR AND DELIVERY | Facility: HOSPITAL | Age: 29
End: 2024-07-03
Payer: MEDICAID

## 2024-07-03 DIAGNOSIS — Z98.891 S/P CESAREAN SECTION: Primary | ICD-10-CM

## 2024-07-03 LAB
ABO GROUP BLD: NORMAL
BLD GP AB SCN SERPL QL: NEGATIVE
DEPRECATED RDW RBC AUTO: 66 FL (ref 37–54)
ERYTHROCYTE [DISTWIDTH] IN BLOOD BY AUTOMATED COUNT: 19.6 % (ref 12.3–15.4)
HCT VFR BLD AUTO: 32.1 % (ref 34–46.6)
HGB BLD-MCNC: 11.6 G/DL (ref 12–15.9)
MCH RBC QN AUTO: 34.2 PG (ref 26.6–33)
MCHC RBC AUTO-ENTMCNC: 36.1 G/DL (ref 31.5–35.7)
MCV RBC AUTO: 94.7 FL (ref 79–97)
PLATELET # BLD AUTO: 402 10*3/MM3 (ref 140–450)
PMV BLD AUTO: 9.5 FL (ref 6–12)
RBC # BLD AUTO: 3.39 10*6/MM3 (ref 3.77–5.28)
RH BLD: POSITIVE
T&S EXPIRATION DATE: NORMAL
TREPONEMA PALLIDUM IGG+IGM AB [PRESENCE] IN SERUM OR PLASMA BY IMMUNOASSAY: NORMAL
WBC NRBC COR # BLD AUTO: 21.05 10*3/MM3 (ref 3.4–10.8)

## 2024-07-03 PROCEDURE — 25810000003 LACTATED RINGERS SOLUTION: Performed by: OBSTETRICS & GYNECOLOGY

## 2024-07-03 PROCEDURE — 86850 RBC ANTIBODY SCREEN: CPT | Performed by: OBSTETRICS & GYNECOLOGY

## 2024-07-03 PROCEDURE — 59514 CESAREAN DELIVERY ONLY: CPT | Performed by: OBSTETRICS & GYNECOLOGY

## 2024-07-03 PROCEDURE — 86901 BLOOD TYPING SEROLOGIC RH(D): CPT | Performed by: OBSTETRICS & GYNECOLOGY

## 2024-07-03 PROCEDURE — 25810000003 LACTATED RINGERS PER 1000 ML: Performed by: STUDENT IN AN ORGANIZED HEALTH CARE EDUCATION/TRAINING PROGRAM

## 2024-07-03 PROCEDURE — 25010000002 MORPHINE PER 10 MG: Performed by: STUDENT IN AN ORGANIZED HEALTH CARE EDUCATION/TRAINING PROGRAM

## 2024-07-03 PROCEDURE — 85027 COMPLETE CBC AUTOMATED: CPT | Performed by: OBSTETRICS & GYNECOLOGY

## 2024-07-03 PROCEDURE — 25010000002 CEFAZOLIN PER 500 MG: Performed by: OBSTETRICS & GYNECOLOGY

## 2024-07-03 PROCEDURE — 25010000002 DIPHENHYDRAMINE PER 50 MG: Performed by: STUDENT IN AN ORGANIZED HEALTH CARE EDUCATION/TRAINING PROGRAM

## 2024-07-03 PROCEDURE — 25010000002 HYDROMORPHONE PER 4 MG: Performed by: STUDENT IN AN ORGANIZED HEALTH CARE EDUCATION/TRAINING PROGRAM

## 2024-07-03 PROCEDURE — 25010000002 KETOROLAC TROMETHAMINE PER 15 MG: Performed by: OBSTETRICS & GYNECOLOGY

## 2024-07-03 PROCEDURE — 86780 TREPONEMA PALLIDUM: CPT | Performed by: OBSTETRICS & GYNECOLOGY

## 2024-07-03 PROCEDURE — 86900 BLOOD TYPING SEROLOGIC ABO: CPT | Performed by: OBSTETRICS & GYNECOLOGY

## 2024-07-03 PROCEDURE — 25010000002 BUPIVACAINE PF 0.75 % SOLUTION: Performed by: STUDENT IN AN ORGANIZED HEALTH CARE EDUCATION/TRAINING PROGRAM

## 2024-07-03 PROCEDURE — 25010000002 FENTANYL CITRATE (PF) 50 MCG/ML SOLUTION: Performed by: STUDENT IN AN ORGANIZED HEALTH CARE EDUCATION/TRAINING PROGRAM

## 2024-07-03 PROCEDURE — 25010000002 ONDANSETRON PER 1 MG: Performed by: OBSTETRICS & GYNECOLOGY

## 2024-07-03 RX ORDER — LIDOCAINE HYDROCHLORIDE 10 MG/ML
0.5 INJECTION, SOLUTION INFILTRATION; PERINEURAL ONCE AS NEEDED
Status: DISCONTINUED | OUTPATIENT
Start: 2024-07-03 | End: 2024-07-03 | Stop reason: HOSPADM

## 2024-07-03 RX ORDER — PHENYLEPHRINE HCL IN 0.9% NACL 1 MG/10 ML
SYRINGE (ML) INTRAVENOUS AS NEEDED
Status: DISCONTINUED | OUTPATIENT
Start: 2024-07-03 | End: 2024-07-03 | Stop reason: SURG

## 2024-07-03 RX ORDER — DROPERIDOL 2.5 MG/ML
0.62 INJECTION, SOLUTION INTRAMUSCULAR; INTRAVENOUS
Status: DISCONTINUED | OUTPATIENT
Start: 2024-07-03 | End: 2024-07-05 | Stop reason: HOSPADM

## 2024-07-03 RX ORDER — IBUPROFEN 600 MG/1
600 TABLET ORAL EVERY 6 HOURS
Status: DISCONTINUED | OUTPATIENT
Start: 2024-07-04 | End: 2024-07-05 | Stop reason: HOSPADM

## 2024-07-03 RX ORDER — SODIUM CHLORIDE, SODIUM LACTATE, POTASSIUM CHLORIDE, CALCIUM CHLORIDE 600; 310; 30; 20 MG/100ML; MG/100ML; MG/100ML; MG/100ML
INJECTION, SOLUTION INTRAVENOUS CONTINUOUS PRN
Status: DISCONTINUED | OUTPATIENT
Start: 2024-07-03 | End: 2024-07-03 | Stop reason: SURG

## 2024-07-03 RX ORDER — OXYCODONE HYDROCHLORIDE 5 MG/1
5 TABLET ORAL EVERY 4 HOURS PRN
Status: DISCONTINUED | OUTPATIENT
Start: 2024-07-03 | End: 2024-07-05 | Stop reason: HOSPADM

## 2024-07-03 RX ORDER — SODIUM CHLORIDE 0.9 % (FLUSH) 0.9 %
10 SYRINGE (ML) INJECTION AS NEEDED
Status: DISCONTINUED | OUTPATIENT
Start: 2024-07-03 | End: 2024-07-03 | Stop reason: HOSPADM

## 2024-07-03 RX ORDER — FENTANYL CITRATE 50 UG/ML
INJECTION, SOLUTION INTRAMUSCULAR; INTRAVENOUS
Status: COMPLETED | OUTPATIENT
Start: 2024-07-03 | End: 2024-07-03

## 2024-07-03 RX ORDER — ONDANSETRON 2 MG/ML
4 INJECTION INTRAMUSCULAR; INTRAVENOUS ONCE AS NEEDED
Status: DISCONTINUED | OUTPATIENT
Start: 2024-07-03 | End: 2024-07-05 | Stop reason: HOSPADM

## 2024-07-03 RX ORDER — ACETAMINOPHEN 500 MG
1000 TABLET ORAL ONCE
Status: COMPLETED | OUTPATIENT
Start: 2024-07-03 | End: 2024-07-03

## 2024-07-03 RX ORDER — FAMOTIDINE 10 MG/ML
20 INJECTION, SOLUTION INTRAVENOUS ONCE
Status: DISCONTINUED | OUTPATIENT
Start: 2024-07-03 | End: 2024-07-03

## 2024-07-03 RX ORDER — DIPHENHYDRAMINE HYDROCHLORIDE 50 MG/ML
25 INJECTION INTRAMUSCULAR; INTRAVENOUS EVERY 4 HOURS PRN
Status: DISCONTINUED | OUTPATIENT
Start: 2024-07-03 | End: 2024-07-05 | Stop reason: HOSPADM

## 2024-07-03 RX ORDER — ONDANSETRON 2 MG/ML
4 INJECTION INTRAMUSCULAR; INTRAVENOUS ONCE AS NEEDED
Status: DISCONTINUED | OUTPATIENT
Start: 2024-07-03 | End: 2024-07-03 | Stop reason: HOSPADM

## 2024-07-03 RX ORDER — KETOROLAC TROMETHAMINE 30 MG/ML
30 INJECTION, SOLUTION INTRAMUSCULAR; INTRAVENOUS ONCE
Status: COMPLETED | OUTPATIENT
Start: 2024-07-03 | End: 2024-07-03

## 2024-07-03 RX ORDER — ACETAMINOPHEN 325 MG/1
650 TABLET ORAL EVERY 6 HOURS
Status: DISCONTINUED | OUTPATIENT
Start: 2024-07-04 | End: 2024-07-05 | Stop reason: HOSPADM

## 2024-07-03 RX ORDER — ONDANSETRON 2 MG/ML
4 INJECTION INTRAMUSCULAR; INTRAVENOUS EVERY 6 HOURS PRN
Status: DISCONTINUED | OUTPATIENT
Start: 2024-07-03 | End: 2024-07-03

## 2024-07-03 RX ORDER — HYDROCORTISONE 25 MG/G
CREAM TOPICAL 3 TIMES DAILY PRN
Status: DISCONTINUED | OUTPATIENT
Start: 2024-07-03 | End: 2024-07-05 | Stop reason: HOSPADM

## 2024-07-03 RX ORDER — DIPHENHYDRAMINE HCL 25 MG
25 CAPSULE ORAL EVERY 4 HOURS PRN
Status: DISCONTINUED | OUTPATIENT
Start: 2024-07-03 | End: 2024-07-05 | Stop reason: HOSPADM

## 2024-07-03 RX ORDER — SODIUM CHLORIDE 9 MG/ML
40 INJECTION, SOLUTION INTRAVENOUS AS NEEDED
Status: DISCONTINUED | OUTPATIENT
Start: 2024-07-03 | End: 2024-07-03 | Stop reason: HOSPADM

## 2024-07-03 RX ORDER — OXYTOCIN/0.9 % SODIUM CHLORIDE 30/500 ML
125 PLASTIC BAG, INJECTION (ML) INTRAVENOUS ONCE AS NEEDED
Status: COMPLETED | OUTPATIENT
Start: 2024-07-03 | End: 2024-07-03

## 2024-07-03 RX ORDER — OXYTOCIN/0.9 % SODIUM CHLORIDE 30/500 ML
999 PLASTIC BAG, INJECTION (ML) INTRAVENOUS ONCE
Status: DISCONTINUED | OUTPATIENT
Start: 2024-07-03 | End: 2024-07-03 | Stop reason: HOSPADM

## 2024-07-03 RX ORDER — CITRIC ACID/SODIUM CITRATE 334-500MG
30 SOLUTION, ORAL ORAL ONCE
Status: COMPLETED | OUTPATIENT
Start: 2024-07-03 | End: 2024-07-03

## 2024-07-03 RX ORDER — DOCUSATE SODIUM 100 MG/1
100 CAPSULE, LIQUID FILLED ORAL 2 TIMES DAILY PRN
Status: DISCONTINUED | OUTPATIENT
Start: 2024-07-03 | End: 2024-07-05 | Stop reason: HOSPADM

## 2024-07-03 RX ORDER — ONDANSETRON 4 MG/1
4 TABLET, ORALLY DISINTEGRATING ORAL EVERY 8 HOURS PRN
Status: DISCONTINUED | OUTPATIENT
Start: 2024-07-03 | End: 2024-07-05 | Stop reason: HOSPADM

## 2024-07-03 RX ORDER — PRENATAL VIT/IRON FUM/FOLIC AC 27MG-0.8MG
1 TABLET ORAL DAILY
Status: DISCONTINUED | OUTPATIENT
Start: 2024-07-04 | End: 2024-07-05 | Stop reason: HOSPADM

## 2024-07-03 RX ORDER — OXYTOCIN/0.9 % SODIUM CHLORIDE 30/500 ML
250 PLASTIC BAG, INJECTION (ML) INTRAVENOUS CONTINUOUS
Status: DISPENSED | OUTPATIENT
Start: 2024-07-03 | End: 2024-07-03

## 2024-07-03 RX ORDER — ONDANSETRON 2 MG/ML
4 INJECTION INTRAMUSCULAR; INTRAVENOUS ONCE
Status: COMPLETED | OUTPATIENT
Start: 2024-07-03 | End: 2024-07-03

## 2024-07-03 RX ORDER — MORPHINE SULFATE 4 MG/ML
INJECTION, SOLUTION INTRAMUSCULAR; INTRAVENOUS
Status: COMPLETED | OUTPATIENT
Start: 2024-07-03 | End: 2024-07-03

## 2024-07-03 RX ORDER — ONDANSETRON 2 MG/ML
4 INJECTION INTRAMUSCULAR; INTRAVENOUS EVERY 6 HOURS PRN
Status: DISCONTINUED | OUTPATIENT
Start: 2024-07-03 | End: 2024-07-05 | Stop reason: HOSPADM

## 2024-07-03 RX ORDER — FAMOTIDINE 10 MG/ML
20 INJECTION, SOLUTION INTRAVENOUS ONCE AS NEEDED
Status: COMPLETED | OUTPATIENT
Start: 2024-07-03 | End: 2024-07-03

## 2024-07-03 RX ORDER — CARBOPROST TROMETHAMINE 250 UG/ML
250 INJECTION, SOLUTION INTRAMUSCULAR AS NEEDED
Status: DISCONTINUED | OUTPATIENT
Start: 2024-07-03 | End: 2024-07-03 | Stop reason: HOSPADM

## 2024-07-03 RX ORDER — MISOPROSTOL 200 UG/1
800 TABLET ORAL AS NEEDED
Status: DISCONTINUED | OUTPATIENT
Start: 2024-07-03 | End: 2024-07-03 | Stop reason: HOSPADM

## 2024-07-03 RX ORDER — HYDROXYZINE 50 MG/1
50 TABLET, FILM COATED ORAL EVERY 6 HOURS PRN
Status: DISCONTINUED | OUTPATIENT
Start: 2024-07-03 | End: 2024-07-05 | Stop reason: HOSPADM

## 2024-07-03 RX ORDER — SODIUM CHLORIDE 0.9 % (FLUSH) 0.9 %
10 SYRINGE (ML) INJECTION EVERY 12 HOURS SCHEDULED
Status: DISCONTINUED | OUTPATIENT
Start: 2024-07-03 | End: 2024-07-03 | Stop reason: HOSPADM

## 2024-07-03 RX ORDER — FAMOTIDINE 10 MG/ML
20 INJECTION, SOLUTION INTRAVENOUS EVERY 12 HOURS SCHEDULED
Status: DISCONTINUED | OUTPATIENT
Start: 2024-07-03 | End: 2024-07-03

## 2024-07-03 RX ORDER — METHYLERGONOVINE MALEATE 0.2 MG/ML
200 INJECTION INTRAVENOUS ONCE AS NEEDED
Status: DISCONTINUED | OUTPATIENT
Start: 2024-07-03 | End: 2024-07-03 | Stop reason: HOSPADM

## 2024-07-03 RX ORDER — PROMETHAZINE HYDROCHLORIDE 12.5 MG/1
12.5 TABLET ORAL EVERY 4 HOURS PRN
Status: DISCONTINUED | OUTPATIENT
Start: 2024-07-03 | End: 2024-07-05 | Stop reason: HOSPADM

## 2024-07-03 RX ORDER — NALOXONE HCL 0.4 MG/ML
0.2 VIAL (ML) INJECTION
Status: CANCELLED | OUTPATIENT
Start: 2024-07-03

## 2024-07-03 RX ORDER — KETOROLAC TROMETHAMINE 15 MG/ML
15 INJECTION, SOLUTION INTRAMUSCULAR; INTRAVENOUS EVERY 6 HOURS
Status: DISPENSED | OUTPATIENT
Start: 2024-07-04 | End: 2024-07-04

## 2024-07-03 RX ORDER — SODIUM CHLORIDE, SODIUM LACTATE, POTASSIUM CHLORIDE, CALCIUM CHLORIDE 600; 310; 30; 20 MG/100ML; MG/100ML; MG/100ML; MG/100ML
125 INJECTION, SOLUTION INTRAVENOUS CONTINUOUS
Status: DISCONTINUED | OUTPATIENT
Start: 2024-07-03 | End: 2024-07-03

## 2024-07-03 RX ORDER — HYDROMORPHONE HYDROCHLORIDE 1 MG/ML
0.5 INJECTION, SOLUTION INTRAMUSCULAR; INTRAVENOUS; SUBCUTANEOUS
Status: DISCONTINUED | OUTPATIENT
Start: 2024-07-03 | End: 2024-07-03 | Stop reason: HOSPADM

## 2024-07-03 RX ORDER — ACETAMINOPHEN 500 MG
1000 TABLET ORAL EVERY 6 HOURS
Status: COMPLETED | OUTPATIENT
Start: 2024-07-03 | End: 2024-07-04

## 2024-07-03 RX ORDER — OXYCODONE HYDROCHLORIDE 10 MG/1
10 TABLET ORAL EVERY 4 HOURS PRN
Status: DISCONTINUED | OUTPATIENT
Start: 2024-07-03 | End: 2024-07-05 | Stop reason: HOSPADM

## 2024-07-03 RX ORDER — BUPIVACAINE HYDROCHLORIDE 7.5 MG/ML
INJECTION, SOLUTION EPIDURAL; RETROBULBAR
Status: COMPLETED | OUTPATIENT
Start: 2024-07-03 | End: 2024-07-03

## 2024-07-03 RX ADMIN — SODIUM CHLORIDE 2000 MG: 900 INJECTION INTRAVENOUS at 17:02

## 2024-07-03 RX ADMIN — Medication 125 ML/HR: at 19:40

## 2024-07-03 RX ADMIN — Medication 200 MCG: at 17:50

## 2024-07-03 RX ADMIN — SODIUM CHLORIDE, POTASSIUM CHLORIDE, SODIUM LACTATE AND CALCIUM CHLORIDE: 600; 310; 30; 20 INJECTION, SOLUTION INTRAVENOUS at 17:26

## 2024-07-03 RX ADMIN — ACETAMINOPHEN 1000 MG: 500 TABLET ORAL at 23:58

## 2024-07-03 RX ADMIN — Medication 200 MCG: at 17:44

## 2024-07-03 RX ADMIN — MORPHINE SULFATE 100 MCG: 4 INJECTION, SOLUTION INTRAMUSCULAR; INTRAVENOUS at 17:34

## 2024-07-03 RX ADMIN — ONDANSETRON 4 MG: 2 INJECTION INTRAMUSCULAR; INTRAVENOUS at 17:02

## 2024-07-03 RX ADMIN — Medication 999 ML/HR: at 17:53

## 2024-07-03 RX ADMIN — ACETAMINOPHEN 1000 MG: 500 TABLET ORAL at 17:02

## 2024-07-03 RX ADMIN — FAMOTIDINE 20 MG: 10 INJECTION INTRAVENOUS at 17:01

## 2024-07-03 RX ADMIN — SODIUM CITRATE AND CITRIC ACID MONOHYDRATE 30 ML: 334; 500 SOLUTION ORAL at 17:01

## 2024-07-03 RX ADMIN — HYDROMORPHONE HYDROCHLORIDE 0.5 MG: 1 INJECTION, SOLUTION INTRAMUSCULAR; INTRAVENOUS; SUBCUTANEOUS at 19:40

## 2024-07-03 RX ADMIN — SODIUM CHLORIDE, POTASSIUM CHLORIDE, SODIUM LACTATE AND CALCIUM CHLORIDE: 600; 310; 30; 20 INJECTION, SOLUTION INTRAVENOUS at 18:07

## 2024-07-03 RX ADMIN — FENTANYL CITRATE 10 MCG: 50 INJECTION, SOLUTION INTRAMUSCULAR; INTRAVENOUS at 17:34

## 2024-07-03 RX ADMIN — BUPIVACAINE HYDROCHLORIDE 1.3 ML: 7.5 INJECTION, SOLUTION EPIDURAL; RETROBULBAR at 17:34

## 2024-07-03 RX ADMIN — DIPHENHYDRAMINE HYDROCHLORIDE 25 MG: 50 INJECTION, SOLUTION INTRAMUSCULAR; INTRAVENOUS at 21:46

## 2024-07-03 RX ADMIN — KETOROLAC TROMETHAMINE 30 MG: 30 INJECTION, SOLUTION INTRAMUSCULAR at 19:00

## 2024-07-03 RX ADMIN — Medication 200 MCG: at 17:38

## 2024-07-03 RX ADMIN — SODIUM CHLORIDE, POTASSIUM CHLORIDE, SODIUM LACTATE AND CALCIUM CHLORIDE 1000 ML: 600; 310; 30; 20 INJECTION, SOLUTION INTRAVENOUS at 16:39

## 2024-07-03 RX ADMIN — OXYCODONE HYDROCHLORIDE 5 MG: 5 TABLET ORAL at 22:26

## 2024-07-03 NOTE — ANESTHESIA PROCEDURE NOTES
Spinal Block      Patient reassessed immediately prior to procedure    Patient location during procedure: OR  Indication:at surgeon's request  Performed By  Anesthesiologist: Jose Francisco Becerra MD  Preanesthetic Checklist  Completed: patient identified, IV checked, site marked, risks and benefits discussed, surgical consent, monitors and equipment checked, pre-op evaluation and timeout performed  Spinal Block Prep:  Patient Position:sitting  Sterile Tech:cap, gloves and sterile barriers  Prep:Chloraprep  Patient Monitoring:EKG, continuous pulse oximetry and blood pressure monitoring    Spinal Block Procedure  Approach:midline  Guidance:landmark technique and palpation technique  Location:L3-L4  Needle Type:Corey  Needle Gauge:25 G  Paresthesia: transient  Fluid Appearance:clear  Medications: fentaNYL citrate (PF) (SUBLIMAZE) injection - Intrathecal   10 mcg - 7/3/2024 5:34:00 PM  Morphine sulfate (PF) injection - Spinal   100 mcg - 7/3/2024 5:34:00 PM  bupivacaine PF (MARCAINE) 0.75 % injection - Spinal   1.3 mL - 7/3/2024 5:34:00 PM   Post Assessment  Patient Tolerance:patient tolerated the procedure well with no apparent complications  Complications no

## 2024-07-03 NOTE — PLAN OF CARE
Problem: Adult Inpatient Plan of Care  Goal: Plan of Care Review  7/3/2024 1929 by Soledad Castaneda RN  Flowsheets (Taken 7/3/2024 1929)  Progress: improving  Plan of Care Reviewed With: patient  Outcome Evaluation:   Delivery via C/S. Bleeding WNL. Pt complains of pain, rating 5/10   see MAR. VSS. Family bonding at bedside. Anticipate full recovery.  7/3/2024 1929 by Soledad Castaneda RN  Outcome: Ongoing, Progressing  Flowsheets (Taken 7/3/2024 1929)  Progress: improving  Plan of Care Reviewed With: patient  Outcome Evaluation:   Delivery via C/S. Bleeding WNL. Pt complains of pain, rating 5/10   see MAR. VSS. Family bonding at bedside. Anticipate full recovery.  Goal: Patient-Specific Goal (Individualized)  Outcome: Ongoing, Progressing  Goal: Absence of Hospital-Acquired Illness or Injury  Outcome: Ongoing, Progressing  Intervention: Identify and Manage Fall Risk  Recent Flowsheet Documentation  Taken 7/3/2024 1915 by Soledad Castaneda RN  Safety Promotion/Fall Prevention: safety round/check completed  Taken 7/3/2024 1556 by Soledad Castaneda RN  Safety Promotion/Fall Prevention:   nonskid shoes/slippers when out of bed   room organization consistent   safety round/check completed  Intervention: Prevent and Manage VTE (Venous Thromboembolism) Risk  Recent Flowsheet Documentation  Taken 7/3/2024 1915 by Soledad Castaneda RN  VTE Prevention/Management:   bilateral   sequential compression devices on  Taken 7/3/2024 1845 by Soledad Castaneda RN  VTE Prevention/Management:   bilateral   sequential compression devices on  Goal: Optimal Comfort and Wellbeing  Outcome: Ongoing, Progressing  Intervention: Provide Person-Centered Care  Recent Flowsheet Documentation  Taken 7/3/2024 1915 by Soledad Castaneda RN  Trust Relationship/Rapport:   care explained   choices provided   emotional support provided   empathic listening provided   questions answered   questions encouraged   reassurance provided   thoughts/feelings acknowledged  Taken  7/3/2024 1556 by Soledad Castaneda RN  Trust Relationship/Rapport:   care explained   choices provided   emotional support provided   empathic listening provided   questions answered   questions encouraged   reassurance provided   thoughts/feelings acknowledged  Goal: Readiness for Transition of Care  Outcome: Ongoing, Progressing  Intervention: Mutually Develop Transition Plan  Recent Flowsheet Documentation  Taken 7/3/2024 1554 by Soledad Castaneda, RN  Equipment Currently Used at Home: none  Taken 7/3/2024 1552 by Soledad Castaneda RN  Equipment Needed After Discharge: none  Equipment Currently Used at Home: none  Anticipated Changes Related to Illness: none  Transportation Anticipated:   car, drives self   family or friend will provide  Transportation Concerns: none  Concerns to be Addressed: no discharge needs identified  Readmission Within the Last 30 Days: no previous admission in last 30 days  Patient/Family Anticipated Services at Transition: none  Patient/Family Anticipates Transition to:   home   home with family     Problem: Skin Injury Risk Increased  Goal: Skin Health and Integrity  Outcome: Ongoing, Progressing     Problem: Device-Related Complication Risk (Anesthesia/Analgesia, Neuraxial)  Goal: Safe Infusion Delivery Completion  Outcome: Ongoing, Progressing     Problem: Infection (Anesthesia/Analgesia, Neuraxial)  Goal: Absence of Infection Signs and Symptoms  Outcome: Ongoing, Progressing     Problem: Nausea and Vomiting (Anesthesia/Analgesia, Neuraxial)  Goal: Nausea and Vomiting Relief  Outcome: Ongoing, Progressing     Problem: Pain (Anesthesia/Analgesia, Neuraxial)  Goal: Effective Pain Control  Outcome: Ongoing, Progressing  Intervention: Prevent or Manage Pain  Recent Flowsheet Documentation  Taken 7/3/2024 1915 by Soledad Castaneda RN  Diversional Activities: smartphone  Taken 7/3/2024 1556 by Soledad Castaneda RN  Diversional Activities: smartphone     Problem: Respiratory Compromise  (Anesthesia/Analgesia, Neuraxial)  Goal: Effective Oxygenation and Ventilation  Outcome: Ongoing, Progressing     Problem: Sensorimotor Impairment (Anesthesia/Analgesia, Neuraxial)  Goal: Baseline Motor Function  Outcome: Ongoing, Progressing  Intervention: Optimize Sensorimotor Function  Recent Flowsheet Documentation  Taken 7/3/2024 1915 by Soledad Castaneda RN  Safety Promotion/Fall Prevention: safety round/check completed  Taken 7/3/2024 1556 by Soledad Castaneda RN  Safety Promotion/Fall Prevention:   nonskid shoes/slippers when out of bed   room organization consistent   safety round/check completed     Problem: Urinary Retention (Anesthesia/Analgesia, Neuraxial)  Goal: Effective Urinary Elimination  Outcome: Ongoing, Progressing     Problem: Pain Acute  Goal: Acceptable Pain Control and Functional Ability  Outcome: Ongoing, Progressing  Intervention: Optimize Psychosocial Wellbeing  Recent Flowsheet Documentation  Taken 7/3/2024 1915 by Soledad Castaneda RN  Diversional Activities: smartphone  Taken 7/3/2024 1556 by Soledad Castaneda RN  Diversional Activities: smartphone     Problem: Fall Injury Risk  Goal: Absence of Fall and Fall-Related Injury  Outcome: Ongoing, Progressing  Intervention: Promote Injury-Free Environment  Recent Flowsheet Documentation  Taken 7/3/2024 1915 by Soledad Castaneda RN  Safety Promotion/Fall Prevention: safety round/check completed  Taken 7/3/2024 1556 by Soledad Castaneda RN  Safety Promotion/Fall Prevention:   nonskid shoes/slippers when out of bed   room organization consistent   safety round/check completed   Goal Outcome Evaluation:

## 2024-07-03 NOTE — ANESTHESIA PREPROCEDURE EVALUATION
Anesthesia Evaluation     Patient summary reviewed and Nursing notes reviewed   NPO Solid Status: > 8 hours  NPO Liquid Status: > 2 hours           Airway   Mallampati: II  TM distance: >3 FB  Neck ROM: full  Dental      Comment: Denies any chipped, cracked, or loose teeth     Pulmonary - normal exam   (+) asthma (allergy induced, extremely rare rescue inhaler use),  (-) COPD, sleep apnea  Cardiovascular - normal exam  Exercise tolerance: good (4-7 METS)    (-) hypertension, CAD, dysrhythmias, angina      Neuro/Psych  (-) seizures, TIA, CVA  GI/Hepatic/Renal/Endo    (+) GERD (gestational)  (-) liver disease, no renal disease, diabetes    Musculoskeletal     Abdominal    Substance History      OB/GYN    (+) Pregnant ([id number])    Comment: History of chlamydia  HPV infection        Other   blood dyscrasia (hereditary spherocytosis) anemia,     ROS/Med Hx Other: WBC       Date                     Value               Ref Range           Status                04/24/2024               19.2 (H)            3.4 - 10.8 x10*     Final                 06/24/2022               11.43 (H)           4.5 - 11.0 10**     Final            ----------  RBC       Date                     Value               Ref Range           Status                04/24/2024               3.15 (L)            3.77 - 5.28 x1*     Final                 06/24/2022               2.87 (L)            4.0 - 5.2 10*6*     Final            ----------  Hemoglobin       Date                     Value               Ref Range           Status                04/24/2024               10.3 (L)            11.1 - 15.9 g/*     Final                 06/24/2022               9.9 (L)             12.0 - 16.0 g/*     Final            ----------  Hematocrit       Date                     Value               Ref Range           Status                04/24/2024               30.7 (L)            34.0 - 46.6 %       Final                 06/24/2022               28.7 (L)             36.0 - 46.0 %       Final            ----------  MCV       Date                     Value               Ref Range           Status                04/24/2024               98 (H)              79 - 97 fL          Final                 06/24/2022               100.0               80.0 - 100.0 fL     Final            ----------  MCH       Date                     Value               Ref Range           Status                04/24/2024               32.7                26.6 - 33.0 pg      Final                 06/24/2022               34.5 (H)            26.0 - 34.0 pg      Final            ----------  MCHC       Date                     Value               Ref Range           Status                04/24/2024               33.6                31.5 - 35.7 g/*     Final                 06/24/2022               34.5                31.0 - 37.0 g/*     Final            ----------  RDW       Date                     Value               Ref Range           Status                04/24/2024               21.6 (H)            11.7 - 15.4 %       Final                 06/24/2022               20.6 (H)            12.0 - 16.8 %       Final            ----------  RDW-SD       Date                     Value               Ref Range           Status                02/20/2019               73.9 (H)            37.0 - 54.0 fl      Final            ----------  MPV       Date                     Value               Ref Range           Status                06/24/2022               9.8                 8.4 - 12.4 fL       Final            ----------  Platelets       Date                     Value               Ref Range           Status                04/24/2024               383                 150 - 450 x10E*     Final                 06/24/2022               300                 140 - 440 10*3*     Final            ----------  Neutrophil Rel %       Date                     Value               Ref Range           Status                 11/29/2023               68                  Not Estab. %        Final                 06/24/2022               69.3                45 - 80 %           Final            ----------  Lymphocyte Rel %       Date                     Value               Ref Range           Status                11/29/2023               23                  Not Estab. %        Final                 06/24/2022               22.7                15 - 50 %           Final            ----------  Monocyte Rel %       Date                     Value               Ref Range           Status                11/29/2023               4                   Not Estab. %        Final                 06/24/2022               4.3                 0 - 15 %            Final            ----------  Eosinophil Rel %       Date                     Value               Ref Range           Status                11/29/2023               3                   Not Estab. %        Final            ----------  Eosinophil %       Date                     Value               Ref Range           Status                06/24/2022               2.4                 0 - 7 %             Final            ----------  Basophil Rel %       Date                     Value               Ref Range           Status                11/29/2023               1                   Not Estab. %        Final                 06/24/2022               1.0                 0 - 2 %             Final            ----------  Immature Grans %       Date                     Value               Ref Range           Status                06/24/2022               0.3                 0.0 - 1.0 %         Final            ----------  Neutrophils Absolute       Date                     Value               Ref Range           Status                11/29/2023               10.0 (H)            1.4 - 7.0 x10E*     Final                 06/24/2022               7.92                2.0 - 8.8 10*3*     Final             ----------  Lymphocytes Absolute       Date                     Value               Ref Range           Status                11/29/2023               3.5 (H)             0.7 - 3.1 x10E*     Final                 06/24/2022               2.60                0.7 - 5.5 10*3*     Final            ----------  Monocytes Absolute       Date                     Value               Ref Range           Status                11/29/2023               0.6                 0.1 - 0.9 x10E*     Final                 06/24/2022               0.49                0.0 - 1.7 10*3*     Final            ----------  Eosinophils Absolute       Date                     Value               Ref Range           Status                11/29/2023               0.5 (H)             0.0 - 0.4 x10E*     Final                 06/24/2022               0.27                0.0 - 0.8 10*3*     Final            ----------  Basophils Absolute       Date                     Value               Ref Range           Status                11/29/2023               0.1                 0.0 - 0.2 x10E*     Final                 06/24/2022               0.11                0.0 - 0.2 10*3*     Final            ----------  Immature Grans, Absolute       Date                     Value               Ref Range           Status                06/24/2022               0.04                0.00 - 0.10 10*     Final            ----------  nRBC       Date                     Value               Ref Range           Status                06/24/2022               0                   0 /100(WBC)         Final                 02/20/2019               0.0                 0.0 - 0.0 /100*     Final            ----------                   Anesthesia Plan    ASA 2     spinal     (Complications of neuraxial anesthesia include but not limited to bleeding, infection, damage to surrounding structures, hypotension, itchiness, failed block, and post dural puncture headache.  )    Anesthetic  plan, risks, benefits, and alternatives have been provided, discussed and informed consent has been obtained with: patient, spouse/significant other, other and mother.    Plan discussed with attending.    CODE STATUS:    Level Of Support Discussed With: Patient  Code Status (Patient has no pulse and is not breathing): CPR (Attempt to Resuscitate)  Medical Interventions (Patient has pulse or is breathing): Full Support

## 2024-07-03 NOTE — OP NOTE
Primary low-transverse  section    Indications: malpresentation: Breech presentation    Pre-operative Diagnosis: 1. 39 week 1 day pregnancy.  2.  Breech presentation  3.  Declines ECV    Post-operative Diagnosis: same    Surgeon: Precious Anderson MD     Assistants: Hortencia Malin MD   was responsible for performing the following activities: Retraction, Suction, Irrigation, and Delivery of Fetus and their skilled assistance was necessary for the success of this case.    Anesthesia: Spinal anesthesia    Procedure Details   The patient was seen in the Holding Room. The risks, benefits, complications, treatment options, and expected outcomes were discussed with the patient.  The patient concurred with the proposed plan, giving informed consent.  The site of surgery properly noted/marked. The patient was taken to Operating Room # 3, identified as Ghazala Stroud and the procedure verified as  Delivery. A Time Out was held and the above information confirmed.    After induction of anesthesia, the patient was draped and prepped in the usual sterile manner. A Pfannenstiel incision was made and carried down through the subcutaneous tissue to the fascia. Fascial incision was made and extended transversely. The fascia was  from the underlying rectus tissue superiorly and inferiorly. The peritoneum was identified and entered. Peritoneal incision was extended longitudinally. The utero-vesical peritoneal reflection was incised transversely and the bladder flap was bluntly freed from the lower uterine segment. A low transverse uterine incision was made. Delivered from breech presentation was a 3410 gram female with Apgar scores of 8 at one minute and 9 at five minutes.  The feet were first grasped and delivered through the hysterotomy.  The infant was then delivered sacrum anterior up to the axilla.  Each arm was then swept anteriorly and delivered through the hysterotomy and the fetal head was flexed and  easily delivered through the hysterotomy.  After the umbilical cord was clamped and cut cord blood was obtained for evaluation. The placenta was removed intact and appeared normal. Uterus was exteriorized.  The uterine outline, tubes and ovaries appeared normal. The uterine incision was closed with running locked sutures of 0 Vicryl. A second imbricating layer was placed using 0 Vicryl in a running fashion.  2 figure-of-eight stitches using 0 Vicryl were placed at the center of the hysterotomy for further hemostasis.  Hemostasis was observed. Lavage was carried out until clear. Uterus was returned to the patient's abdomen.  Hysterotomy was revisualized and remained hemostatic.  The peritoneum was reapproximated with 3-0 Vicryl in a running fashion.  The fascia was then reapproximated with running sutures of 0 Vicryl. The subcutaneous layer was reapproximated with 3-0 Vicryl in a running fashion.  The skin was reapproximated with 4-0 monocryl and dermabond.    Instrument, sponge, and needle counts were correct prior the abdominal closure and at the conclusion of the case.     Findings:  Normal pelvic anatomy    Quantitative blood Loss:  253 mL           Drains: Nuñez, draining clear urine           Specimens: Placenta, discarded           Implants: None           Complications:  None; patient tolerated the procedure well.           Disposition: PACU - hemodynamically stable.           Condition: stable    Attending Attestation: I was present and scrubbed for the entire procedure.

## 2024-07-03 NOTE — H&P
[facility]  Obstetric History and Physical    Chief Complaint   Patient presents with    Scheduled Induction     17:30 C/S       Subjective     Patient is a 29 y.o. female  currently at 39w1d, who presents for primary  section due to breech presentation.  Pregnancy was largely uncomplicated.    The following portions of the patients history were reviewed and updated as appropriate: current medications, allergies, past medical history, past surgical history, past family history, past social history, and problem list .       Prenatal Information:  Prenatal Results       Initial Prenatal Labs       Test Value Reference Range Date Time    Hemoglobin  9.6 g/dL 11.1 - 15.9 23 1338    Hematocrit  26.9 % 34.0 - 46.6 23 1338    Platelets  399 x10E3/uL 150 - 450 23 1338    Rubella IgG  2.98 index Immune >0.99 23 1338    Hepatitis B SAg  Negative  Negative 23 1338    Hepatitis C Ab  Non Reactive  Non Reactive 23 1338    RPR  Non Reactive  Non Reactive 23 1338    T. Pallidum Ab         ABO  O   23 1338    Rh  Positive   23 1338    Antibody Screen  Negative  Negative 23 1338    HIV  Non Reactive  Non Reactive 23 1338    Urine Culture  Final report   24 1643       Final report   23 1328    Gonorrhea  Negative  Negative 23 1328    Chlamydia  Negative  Negative 23 1328    TSH        HgB A1c         Varicella IgG        Hemoglobinopathy Fractionation  Comment   19 1541    Hemoglobinopathy (genetic testing)        Cystic fibrosis                   Fetal testing        Test Value Reference Range Date Time    NIPT        MSAFP        AFP-4                  2nd and 3rd Trimester       Test Value Reference Range Date Time    Hemoglobin (repeated)  10.3 g/dL 11.1 - 15.9 24     Hematocrit (repeated)  30.7 % 34.0 - 46.6 24     Platelets   383 x10E3/uL 150 - 450 24        399 x10E3/uL 150 - 450 23 1338     1 hour GTT   118 mg/dL 70 - 139 04/24/24     Antibody Screen (repeated)        3rd TM syphilis scrn (repeated)  RPR         3rd TM syphilis scrn (repeated) FTA  Non Reactive  Non Reactive 04/24/24     GTT Fasting        GTT 1 Hr        GTT 2 Hr        GTT 3 Hr        Group B Strep  Negative  Negative 06/12/24 1643              Other testing        Test Value Reference Range Date Time    Parvo IgG         CMV IgG                   Drug Screening       Test Value Reference Range Date Time    Amphetamine Screen        Barbiturate Screen        Benzodiazepine Screen        Methadone Screen        Phencyclidine Screen        Opiates Screen        THC Screen        Cocaine Screen        Propoxyphene Screen        Buprenorphine Screen        Methamphetamine Screen        Oxycodone Screen        Tricyclic Antidepressants Screen                  Legend    ^: Historical                          External Prenatal Results       Pregnancy Outside Results - Transcribed From Office Records - See Scanned Records For Details       Test Value Date Time    ABO  O  11/29/23 1338    Rh  Positive  11/29/23 1338    Antibody Screen  Negative  11/29/23 1338    Varicella IgG       Rubella  2.98 index 11/29/23 1338    Hgb  10.3 g/dL 04/24/24        9.6 g/dL 11/29/23 1338    Hct  30.7 % 04/24/24        26.9 % 11/29/23 1338    HgB A1c        1h GTT  118 mg/dL 04/24/24     3h GTT Fasting       3h GTT 1 hour       3h GTT 2 hour       3h GTT 3 hour        Gonorrhea (discrete)  Negative  11/29/23 1328    Chlamydia (discrete)  Negative  11/29/23 1328    RPR  Non Reactive  11/29/23 1338    Syphilis Antibody  Non Reactive  04/24/24     HBsAg  Negative  11/29/23 1338    Herpes Simplex Virus PCR       Herpes Simplex VIrus Culture       HIV  Non Reactive  11/29/23 1338    Hep C RNA Quant PCR       Hep C Antibody  Non Reactive  11/29/23 1338    AFP       NIPT       Cystic Fibroisis        Group B Strep  Negative  06/12/24 1643    GBS Susceptibility to  Clindamycin       GBS Susceptibility to Erythromycin       Fetal Fibronectin       Genetic Testing, Maternal Blood                 Drug Screening       Test Value Date Time    Urine Drug Screen       Amphetamine Screen       Barbiturate Screen       Benzodiazepine Screen       Methadone Screen       Phencyclidine Screen       Opiates Screen       THC Screen       Cocaine Screen       Propoxyphene Screen       Buprenorphine Screen       Methamphetamine Screen       Oxycodone Screen       Tricyclic Antidepressants Screen                 Legend    ^: Historical                             Past OB History:     OB History    Para Term  AB Living   3 1 1 0 1 1   SAB IAB Ectopic Molar Multiple Live Births   1 0 0 0 0 1      # Outcome Date GA Lbr Jean-Pierre/2nd Weight Sex Type Anes PTL Lv   3 Current            2 Term 19 39w3d 12:21 / 01:09 3305 g (7 lb 4.6 oz) M Vag-Spont EPI N LISA      Birth Comments: scale 1      Complications: Fetal Intolerance, Temperature elevated      Name: HECTOR GARSIA      Apgar1: 9  Apgar5: 9   1 SAB                Past Medical History: Past Medical History:   Diagnosis Date    Abnormal Pap smear of cervix     Anemia     Asthma     Chlamydia     Fifth disease     Hereditary spherocytosis     History of blood transfusion     History of mononucleosis     History of transfusion     age 7 or 8    HPV (human papilloma virus) infection     Jaundice     Spherocytosis, hereditary       Past Surgical History Past Surgical History:   Procedure Laterality Date    CHOLECYSTECTOMY      WISDOM TOOTH EXTRACTION        Family History: Family History   Problem Relation Age of Onset    Hypertension Mother     Hypertension Father     No Known Problems Sister     Seizures Brother         due to brain tumor    COPD Maternal Grandmother     Cirrhosis Maternal Grandfather     No Known Problems Paternal Grandmother     Deep vein thrombosis Paternal Grandfather       Social History:  reports that  she has never smoked. She has never been exposed to tobacco smoke. She has never used smokeless tobacco.   reports no history of alcohol use.   reports no history of drug use.        General ROS: Pertinent items are noted in HPI, all other systems reviewed and negative    Objective       Vital Signs Range for the last 24 hours  Temperature: Temp:  [98.1 °F (36.7 °C)] 98.1 °F (36.7 °C)   Temp Source: Temp src: Oral   BP: BP: (113)/(61) 113/61   Pulse: Heart Rate:  [94] 94   Respirations: Resp:  [18] 18   SPO2: SpO2:  [100 %] 100 %   O2 Amount (l/min):     O2 Devices Device (Oxygen Therapy): room air   Weight:       Physical Examination: General appearance - alert, well appearing, and in no distress  Chest - clear to auscultation, no wheezes, rales or rhonchi, symmetric air entry  Heart - normal rate and regular rhythm  Abdomen - gravid, nontender  Pelvic - deferred  Extremities - peripheral pulses normal, no pedal edema, no clubbing or cyanosis    Presentation: Breech on bedside ultrasound   Cervix: Exam by:     Dilation:     Effacement:     Station:         Fetal Heart Rate Assessment   Method:     Beats/min:     Baseline:     Variability:     Accels:     Decels:     Tracing Category:       Uterine Assessment   Method:     Frequency (min):     Ctx Count in 10 min:     Duration:     Intensity:     Intensity by IUPC:     Resting Tone:     Resting Tone by IUPC:     [place] Units:           Assessment & Plan       Breech presentation, no version    Maternal anemia in pregnancy, antepartum    Breech presentation, single or unspecified fetus        Assessment:  1.  Intrauterine pregnancy at 39w1d gestation with reassuring fetal status.    2.  Breech presentation  3.  Obstetrical history significant for  breech presentation .  4.  GBS status:   Strep Gp B ALBINO   Date Value Ref Range Status   06/12/2024 Negative Negative Final     Comment:     Centers for Disease Control and Prevention (CDC) and American Congress  of  Obstetricians and Gynecologists (ACOG) guidelines for prevention of   group B streptococcal (GBS) disease specify co-collection of  a vaginal and rectal swab specimen to maximize sensitivity of GBS  detection. Per the CDC and ACOG, swabbing both the lower vagina and  rectum substantially increases the yield of detection compared with  sampling the vagina alone.  Penicillin G, ampicillin, or cefazolin are indicated for intrapartum  prophylaxis of  GBS colonization. Reflex susceptibility  testing should be performed prior to use of clindamycin only on GBS  isolates from penicillin-allergic women who are considered a high risk  for anaphylaxis. Treatment with vancomycin without additional testing  is warranted if resistance to clindamycin is noted.         Plan:  1.  Patient was offered an ECV, and declined.  We will plan to proceed with primary  section for breech presentation.  She was counseled on risks including risk of infection, bleeding, damage to surrounding structures, need for additional surgery if injury occurs and risk of anesthesia, blood clots, heart attack, and stroke.  She understands these risks and wishes to proceed.  2. Plan of care has been reviewed with patient and .  3.  Risks, benefits of treatment plan have been discussed.  4.  All questions have been answered.      Precious Anderson MD  7/3/2024  16:57 EDT

## 2024-07-03 NOTE — ANESTHESIA POSTPROCEDURE EVALUATION
Patient: Ghazala Stroud    Procedure Summary       Date: 24 Room / Location:  [facility]    Anesthesia Start:  Anesthesia Stop:     Procedure:  SECTION PRIMARY (Abdomen) Diagnosis:       Breech presentation, single or unspecified fetus      (Breech presentation, single or unspecified fetus [O32.1XX0])    Surgeons: Precious Anderson MD Provider: Jose Francisco Becerra MD    Anesthesia Type: spinal ASA Status: 2            Anesthesia Type: spinal    Vitals  No vitals data found for the desired time range.          Post Anesthesia Care and Evaluation    Post Neuraxial Block status: No signs or symptoms of PDPH

## 2024-07-04 LAB
BASOPHILS # BLD AUTO: 0.09 10*3/MM3 (ref 0–0.2)
BASOPHILS NFR BLD AUTO: 0.4 % (ref 0–1.5)
DEPRECATED RDW RBC AUTO: 67.9 FL (ref 37–54)
EOSINOPHIL # BLD AUTO: 0.21 10*3/MM3 (ref 0–0.4)
EOSINOPHIL NFR BLD AUTO: 1 % (ref 0.3–6.2)
ERYTHROCYTE [DISTWIDTH] IN BLOOD BY AUTOMATED COUNT: 19.7 % (ref 12.3–15.4)
HCT VFR BLD AUTO: 25.5 % (ref 34–46.6)
HGB BLD-MCNC: 8.9 G/DL (ref 12–15.9)
IMM GRANULOCYTES # BLD AUTO: 0.16 10*3/MM3 (ref 0–0.05)
IMM GRANULOCYTES NFR BLD AUTO: 0.8 % (ref 0–0.5)
LYMPHOCYTES # BLD AUTO: 2.48 10*3/MM3 (ref 0.7–3.1)
LYMPHOCYTES NFR BLD AUTO: 11.7 % (ref 19.6–45.3)
MCH RBC QN AUTO: 33.6 PG (ref 26.6–33)
MCHC RBC AUTO-ENTMCNC: 34.9 G/DL (ref 31.5–35.7)
MCV RBC AUTO: 96.2 FL (ref 79–97)
MONOCYTES # BLD AUTO: 1.16 10*3/MM3 (ref 0.1–0.9)
MONOCYTES NFR BLD AUTO: 5.5 % (ref 5–12)
NEUTROPHILS NFR BLD AUTO: 17.06 10*3/MM3 (ref 1.7–7)
NEUTROPHILS NFR BLD AUTO: 80.6 % (ref 42.7–76)
NRBC BLD AUTO-RTO: 0.2 /100 WBC (ref 0–0.2)
PLATELET # BLD AUTO: 329 10*3/MM3 (ref 140–450)
PMV BLD AUTO: 9.4 FL (ref 6–12)
RBC # BLD AUTO: 2.65 10*6/MM3 (ref 3.77–5.28)
WBC NRBC COR # BLD AUTO: 21.16 10*3/MM3 (ref 3.4–10.8)

## 2024-07-04 PROCEDURE — 85025 COMPLETE CBC W/AUTO DIFF WBC: CPT | Performed by: OBSTETRICS & GYNECOLOGY

## 2024-07-04 PROCEDURE — 25010000002 KETOROLAC TROMETHAMINE PER 15 MG: Performed by: OBSTETRICS & GYNECOLOGY

## 2024-07-04 PROCEDURE — 99231 SBSQ HOSP IP/OBS SF/LOW 25: CPT | Performed by: OBSTETRICS & GYNECOLOGY

## 2024-07-04 RX ADMIN — Medication 1 TABLET: at 08:29

## 2024-07-04 RX ADMIN — ACETAMINOPHEN 1000 MG: 500 TABLET ORAL at 06:24

## 2024-07-04 RX ADMIN — ACETAMINOPHEN 1000 MG: 500 TABLET ORAL at 12:35

## 2024-07-04 RX ADMIN — OXYCODONE HYDROCHLORIDE 5 MG: 5 TABLET ORAL at 17:40

## 2024-07-04 RX ADMIN — KETOROLAC TROMETHAMINE 15 MG: 15 INJECTION, SOLUTION INTRAMUSCULAR; INTRAVENOUS at 02:33

## 2024-07-04 RX ADMIN — IBUPROFEN 600 MG: 600 TABLET ORAL at 20:35

## 2024-07-04 RX ADMIN — IBUPROFEN 600 MG: 600 TABLET ORAL at 14:31

## 2024-07-04 RX ADMIN — MUPIROCIN: 20 OINTMENT TOPICAL at 22:06

## 2024-07-04 RX ADMIN — ACETAMINOPHEN 1000 MG: 500 TABLET ORAL at 18:30

## 2024-07-04 RX ADMIN — KETOROLAC TROMETHAMINE 15 MG: 15 INJECTION, SOLUTION INTRAMUSCULAR; INTRAVENOUS at 08:29

## 2024-07-04 NOTE — LACTATION NOTE
This note was copied from a baby's chart.  P2 term baby, 18hrs old. Baby rooting, crying on Mom's chest upon entering room. Assisted with deep latch and baby has nutritive suckle. Baby has had several wet and stool diapers. Discussed milk production, how to know baby is getting enough milk, feeding cues, expected output, nursing on demand or every 3hrs and call for additional assistance.

## 2024-07-04 NOTE — PROGRESS NOTES
"Subjective:    Cc:  Postpartum    Postpartum Day 1:     The patient feels well.  Pain is well controlled with current medications. The baby is well.  Urinary output is adequate. The patient is ambulating well. The patient is tolerating a normal diet. Flatus has been passed.      Objective:    Vital signs in last 24 hours:  Blood pressure 91/58, pulse 84, temperature 98 °F (36.7 °C), temperature source Oral, resp. rate 16, height 162.6 cm (64\"), last menstrual period 10/03/2023, SpO2 97%, currently breastfeeding.      General:    alert, appears stated age, and cooperative   Uterine Fundus:   firm   Incision:  healing well, no significant drainage, no dehiscence, no significant erythema     Labs    Rh + / Female infant.    Hgb 11.6 --> 8.9    Assessment/Plan:.     Postpartum Day #1  - C/S.  Patient making normal postpartum milestones.  Continue pain control, ambulation.  - Anemia.  Will need iron and vitamins postpartum.      Marques Lira MD     "

## 2024-07-04 NOTE — LACTATION NOTE
This note was copied from a baby's chart.  P2,T this is her first baby to bf. She says so far baby is doing well, she feels tugging and baby is sleeping after release.   Reviewed bf education: feed every 3 hours or on demand, weight expectation, nipple care, how to properly latch. Review PP handbook bf education.hospitals information. She has a PBP.. LC number on board.

## 2024-07-04 NOTE — PLAN OF CARE
Goal Outcome Evaluation:  Plan of Care Reviewed With: patient, spouse        Progress: improving  Outcome Evaluation: 1PO. VSS. Ambulating well with assistance. espino in place. incisional swelling marked, no change since admission to unit. bleeding wnl. pain well managed with ERAS meds.

## 2024-07-04 NOTE — PLAN OF CARE
Goal Outcome Evaluation:   Vitals, fundus, lochia WNL. Incision clean, dry, intact and open to air. Mild swelling to right side of incision (marked). Voiding freely. Ambulating independently. Breastfeeding. Pain controlled with scheduled and PRN medications.

## 2024-07-05 VITALS
HEIGHT: 64 IN | SYSTOLIC BLOOD PRESSURE: 115 MMHG | BODY MASS INDEX: 27.7 KG/M2 | RESPIRATION RATE: 16 BRPM | OXYGEN SATURATION: 97 % | TEMPERATURE: 98.2 F | HEART RATE: 105 BPM | DIASTOLIC BLOOD PRESSURE: 68 MMHG

## 2024-07-05 PROCEDURE — 99238 HOSP IP/OBS DSCHRG MGMT 30/<: CPT | Performed by: OBSTETRICS & GYNECOLOGY

## 2024-07-05 RX ORDER — OXYCODONE HYDROCHLORIDE 5 MG/1
5 TABLET ORAL EVERY 8 HOURS PRN
Qty: 9 TABLET | Refills: 0 | Status: SHIPPED | OUTPATIENT
Start: 2024-07-05 | End: 2024-07-08

## 2024-07-05 RX ORDER — IBUPROFEN 600 MG/1
600 TABLET ORAL EVERY 6 HOURS PRN
Qty: 50 TABLET | Refills: 3 | Status: SHIPPED | OUTPATIENT
Start: 2024-07-05

## 2024-07-05 RX ADMIN — IBUPROFEN 600 MG: 600 TABLET ORAL at 02:37

## 2024-07-05 RX ADMIN — ACETAMINOPHEN 325MG 650 MG: 325 TABLET ORAL at 05:49

## 2024-07-05 RX ADMIN — IBUPROFEN 600 MG: 600 TABLET ORAL at 08:57

## 2024-07-05 RX ADMIN — ACETAMINOPHEN 325MG 650 MG: 325 TABLET ORAL at 12:49

## 2024-07-05 RX ADMIN — OXYCODONE HYDROCHLORIDE 5 MG: 5 TABLET ORAL at 12:49

## 2024-07-05 RX ADMIN — OXYCODONE HYDROCHLORIDE 5 MG: 5 TABLET ORAL at 12:33

## 2024-07-05 RX ADMIN — OXYCODONE HYDROCHLORIDE 10 MG: 10 TABLET ORAL at 02:37

## 2024-07-05 RX ADMIN — Medication 1 TABLET: at 08:57

## 2024-07-05 NOTE — PROGRESS NOTES
Section Progress Note    Assessment & Plan     Status post  section: Doing well postoperatively.     1) postpartum care immediately after delivery: Doing well, desires discharge home. Instructions reviewed.   2) Anemia postpartum. All operative loss. Hg 11.6 g at admission down to 8.9 g post delivery. Home on oral iron.     Rh status: O positive   Syphilis screen delivery admit: NR   Rubella: Immune   Gender: Female     Subjective     Postpartum Day 2:  Delivery    The patient feels well. The patient denies emotional concerns. Pain is well controlled with current medications. The baby is well. Urinary output is adequate. The patient is ambulating well. The patient is tolerating a normal diet. Patient reports flatus.    Objective     Vital signs in last 24 hours:  Temp:  [97.7 °F (36.5 °C)-98.2 °F (36.8 °C)] 98.2 °F (36.8 °C)  Heart Rate:  [] 105  Resp:  [16-17] 16  BP: ()/(54-68) 115/68      General:    alert, appears stated age, and cooperative   Bowel Sounds:  active   Lochia:  appropriate   Uterine Fundus:   firm   Incision:  healing well, no significant drainage, no dehiscence, no significant erythema   DVT Evaluation:  No evidence of DVT seen on physical exam.     Lab Results   Component Value Date    WBC 21.16 (H) 2024    HGB 8.9 (L) 2024    HCT 25.5 (L) 2024    MCV 96.2 2024     2024         Collins Cardoza MD  2024  09:32 EDT

## 2024-07-05 NOTE — DISCHARGE SUMMARY
Date of Discharge:  2024    Discharge Diagnosis: postpartum care immediately after delivery     Presenting Problem/History of Present Illness  Breech presentation, single or unspecified fetus [O32.1XX0]       Hospital Course  Patient is a 29 y.o. female  39w1d presented with scheduled primary  at term for Breech.  For events surrounding her delivery please see delivery/op note.  Her postpartum course was uneventful and today POD#2, she is ready for discharge.  She meets all milestones and criteria for discharge and instructions were reviewed and she voiced understanding.     Procedures Performed  Procedure(s):   SECTION PRIMARY       Consults:   Consults       No orders found from 2024 to 2024.            Pertinent Test Results: Hg post delivery 8.9 g     Condition on Discharge:  stable     Discharge Disposition  Home or Self Care    Discharge Medications     Discharge Medications        New Medications        Instructions Start Date   ibuprofen 600 MG tablet  Commonly known as: ADVIL,MOTRIN   600 mg, Oral, Every 6 Hours PRN      oxyCODONE 5 MG immediate release tablet  Commonly known as: ROXICODONE   5 mg, Oral, Every 8 Hours PRN             Continue These Medications        Instructions Start Date   ferrous sulfate 325 (65 FE) MG tablet   325 mg, Oral, Daily With Breakfast      prenatal vitamin 27-0.8 27-0.8 MG tablet tablet   Oral, Daily               Discharge Diet:   Diet Instructions       Diet: Regular/House Diet; Regular Texture (IDDSI 7); Thin (IDDSI 0)      Discharge Diet: Regular/House Diet    Texture: Regular Texture (IDDSI 7)    Fluid Consistency: Thin (IDDSI 0)            Activity at Discharge:   Activity Instructions       Other Instructions (Specify)      No driving or tub baths for 2 weeks, No heavy lifting and pelvic rest for 6 weeks     Pelvic Rest              Follow-up Appointments  No future appointments.  Additional Instructions for the Follow-ups that You  Need to Schedule       Discharge Follow-up with Specialty: Dr. Anderson; 2 Weeks   As directed      Specialty: Dr. Anderson   Follow Up: 2 Weeks   Follow Up Details: incision check                Test Results Pending at Discharge       Collins Cardoza MD  07/05/24  09:37 EDT

## 2024-07-17 ENCOUNTER — OFFICE VISIT (OUTPATIENT)
Dept: OBSTETRICS AND GYNECOLOGY | Facility: CLINIC | Age: 29
End: 2024-07-17
Payer: MEDICAID

## 2024-07-17 VITALS
SYSTOLIC BLOOD PRESSURE: 104 MMHG | HEART RATE: 97 BPM | HEIGHT: 64 IN | WEIGHT: 144.4 LBS | DIASTOLIC BLOOD PRESSURE: 69 MMHG | BODY MASS INDEX: 24.65 KG/M2

## 2024-07-17 DIAGNOSIS — Z48.89 ENCOUNTER FOR POSTOPERATIVE WOUND CHECK: Primary | ICD-10-CM

## 2024-07-17 RX ORDER — CEPHALEXIN 250 MG/5ML
500 POWDER, FOR SUSPENSION ORAL 2 TIMES DAILY
Qty: 140 ML | Refills: 0 | Status: SHIPPED | OUTPATIENT
Start: 2024-07-17 | End: 2024-07-24

## 2024-07-17 RX ORDER — CEPHALEXIN 500 MG/1
500 CAPSULE ORAL 2 TIMES DAILY
Qty: 14 CAPSULE | Refills: 0 | Status: SHIPPED | OUTPATIENT
Start: 2024-07-17 | End: 2024-07-17

## 2024-07-17 NOTE — PROGRESS NOTES
"Chief Complaint  Post-op- pt states incision looks purple like it is bruised and has stinging pain when sitting up from laying flat. Pt may have mastitis. She states her breasts have been tender along with fever and swollen    Subjective        Ghazala Stroud presents to [facility]  History of Present Illness  Patient is 2 weeks status post primary  at 39 weeks for breech presentation.  She is only using occasional Tylenol for pain control.  She does report she had some fevers and chills yesterday and her breast felt engorged.  She denies any concerns for postpartum depression or anxiety.  She is breast-feeding.  Objective   Vital Signs:  /69   Pulse 97   Ht 162.6 cm (64\")   Wt 65.5 kg (144 lb 6.4 oz)   BMI 24.79 kg/m²   Estimated body mass index is 24.79 kg/m² as calculated from the following:    Height as of this encounter: 162.6 cm (64\").    Weight as of this encounter: 65.5 kg (144 lb 6.4 oz).       BMI is within normal parameters. No other follow-up for BMI required.      Physical Exam  Vitals reviewed.   Constitutional:       Appearance: She is well-developed.   Cardiovascular:      Rate and Rhythm: Normal rate and regular rhythm.   Pulmonary:      Effort: Pulmonary effort is normal.      Breath sounds: Normal breath sounds.   Chest:      Comments: Both breasts are engorged.  No specific blocked duct is felt and no redness.  Abdominal:      General: There is no distension.      Palpations: Abdomen is soft.      Tenderness: There is no abdominal tenderness. There is no guarding or rebound.      Comments: Incision intact with no redness, drainage, or tenderness     Neurological:      Mental Status: She is alert.   Psychiatric:         Mood and Affect: Mood normal.         Behavior: Behavior normal.        Result Review :                     Assessment and Plan     Diagnoses and all orders for this visit:    1. Encounter for postoperative wound check (Primary)    2. " Mastitis associated with childbirth, delivered  -     Discontinue: cephalexin (Keflex) 500 MG capsule; Take 1 capsule by mouth 2 (Two) Times a Day for 7 days.  Dispense: 14 capsule; Refill: 0  -     cephALEXin (KEFLEX) 250 MG/5ML suspension; Take 10 mL by mouth 2 (Two) Times a Day for 7 days.  Dispense: 140 mL; Refill: 0    Patient's incision is healing well.  Discussed continued postoperative wound care and restrictions.  She had no specific findings of mastitis on exam, but both breasts were engorged.  Prescription was sent for Keflex and she was advised to empty her breast every 2-3 hours and to use warm compresses and massage.  She will follow-up in 4 weeks for postpartum visit.         Follow Up     Return in about 4 weeks (around 8/14/2024).  Patient was given instructions and counseling regarding her condition or for health maintenance advice. Please see specific information pulled into the AVS if appropriate.

## 2024-09-06 ENCOUNTER — POSTPARTUM VISIT (OUTPATIENT)
Dept: OBSTETRICS AND GYNECOLOGY | Facility: CLINIC | Age: 29
End: 2024-09-06
Payer: MEDICAID

## 2024-09-06 VITALS — HEIGHT: 64 IN | BODY MASS INDEX: 22.77 KG/M2 | WEIGHT: 133.4 LBS

## 2024-09-06 PROCEDURE — 99214 OFFICE O/P EST MOD 30 MIN: CPT | Performed by: OBSTETRICS & GYNECOLOGY

## 2024-09-06 RX ORDER — NITROFURANTOIN 25; 75 MG/1; MG/1
CAPSULE ORAL
COMMUNITY
Start: 2024-08-26

## 2024-09-06 RX ORDER — CITALOPRAM HYDROBROMIDE 20 MG/1
20 TABLET ORAL DAILY
Qty: 30 TABLET | Refills: 2 | Status: SHIPPED | OUTPATIENT
Start: 2024-09-06 | End: 2025-09-06

## 2024-09-06 NOTE — PROGRESS NOTES
"Chief Complaint  Postpartum Care- pt here for pp exam. Pt had  on 7/3/24     Subjective        Ghazala Stroud presents to [facility]  History of Present Illness  Patient is 2-month status post primary  at 39 weeks due to breech presentation.  She reports today that she has been having issues with postpartum depression and was seen at an outside emergency department after having thoughts of wanting to hurt herself.  She is here today with her .  She was given resources for counseling during the emergency department and has reached out to them, but they have not contacted her to schedule an appointment.  She reports feeling overwhelmed due to having the baby and her other child and stepchildren.  She reports she is not sleeping well.  She recently stopped breast-feeding and is currently decreasing pumping sessions to dry up her milk supply.  Objective   Vital Signs:  Ht 162.6 cm (64\")   Wt 60.5 kg (133 lb 6.4 oz)   BMI 22.90 kg/m²   Estimated body mass index is 22.9 kg/m² as calculated from the following:    Height as of this encounter: 162.6 cm (64\").    Weight as of this encounter: 60.5 kg (133 lb 6.4 oz).    BMI is within normal parameters. No other follow-up for BMI required.      Physical Exam  Vitals reviewed. Exam conducted with a chaperone present.   Constitutional:       Appearance: She is well-developed.   Cardiovascular:      Rate and Rhythm: Normal rate and regular rhythm.   Pulmonary:      Effort: Pulmonary effort is normal.      Breath sounds: Normal breath sounds.   Chest:   Breasts:     Right: No inverted nipple, mass, nipple discharge, skin change or tenderness.      Left: No inverted nipple, mass, nipple discharge, skin change or tenderness.   Abdominal:      General: There is no distension.      Palpations: Abdomen is soft.      Tenderness: There is no abdominal tenderness.   Genitourinary:     Labia:         Right: No rash, tenderness, lesion or " injury.         Left: No rash, tenderness, lesion or injury.       Vagina: Normal.      Cervix: Normal.      Uterus: Normal.       Adnexa:         Right: No mass, tenderness or fullness.          Left: No mass, tenderness or fullness.     Neurological:      Mental Status: She is alert.   Psychiatric:         Mood and Affect: Mood is depressed. Affect is tearful.        Result Review :                Assessment and Plan   Diagnoses and all orders for this visit:    1. Routine postpartum follow-up (Primary)    2. Postpartum depression  -     citalopram (CeleXA) 20 MG tablet; Take 1 tablet by mouth Daily.  Dispense: 30 tablet; Refill: 2  -     Ambulatory Referral to Behavioral Health    I discussed treatment options regarding postpartum depression and I will refer her within [facility] for counseling.  I also recommend starting medication and prescription was sent for Celexa.  She was counseled on common side effects related to the medication.  She may resume all normal activities with no restrictions.  She currently declines any contraception.  I have advised her to follow-up with me in 4 weeks.         Follow Up   Return in about 4 weeks (around 10/4/2024) for Recheck.  Patient was given instructions and counseling regarding her condition or for health maintenance advice. Please see specific information pulled into the AVS if appropriate.

## 2024-10-02 ENCOUNTER — POSTPARTUM VISIT (OUTPATIENT)
Dept: OBSTETRICS AND GYNECOLOGY | Facility: CLINIC | Age: 29
End: 2024-10-02
Payer: MEDICAID

## 2024-10-02 VITALS
HEIGHT: 64 IN | BODY MASS INDEX: 22.81 KG/M2 | SYSTOLIC BLOOD PRESSURE: 111 MMHG | HEART RATE: 82 BPM | WEIGHT: 133.6 LBS | DIASTOLIC BLOOD PRESSURE: 65 MMHG

## 2024-10-02 RX ORDER — CITALOPRAM HYDROBROMIDE 40 MG/1
40 TABLET ORAL DAILY
Qty: 90 TABLET | Refills: 2 | Status: SHIPPED | OUTPATIENT
Start: 2024-10-02 | End: 2025-10-02

## 2024-10-02 NOTE — PROGRESS NOTES
"Chief Complaint  Med Management- Pt was prescribed Celexa at last appointment and is here for follow up    Subjective        Ghazala Stroud presents to [facility]  History of Present Illness  Patient presents for follow-up regarding postpartum depression.  She was started on Celexa approximately 1 month ago due to postpartum depression and previous suicidal thoughts.  She was also referred for counseling, but has not started any counseling or therapy.  She states that her moods have greatly improved since starting Celexa 20 mg, but she is interested in increasing the dose to 40 mg.  The only side effect is that she noticed some drowsiness when for starting it, but that has resolved.  Objective   Vital Signs:  /65   Pulse 82   Ht 162.6 cm (64\")   Wt 60.6 kg (133 lb 9.6 oz)   BMI 22.93 kg/m²   Estimated body mass index is 22.93 kg/m² as calculated from the following:    Height as of this encounter: 162.6 cm (64\").    Weight as of this encounter: 60.6 kg (133 lb 9.6 oz).    BMI is within normal parameters. No other follow-up for BMI required.      Physical Exam  Vitals reviewed.   Constitutional:       Appearance: Normal appearance.   Neurological:      General: No focal deficit present.      Mental Status: She is alert. Mental status is at baseline.   Psychiatric:         Mood and Affect: Mood normal.         Behavior: Behavior normal.        Result Review :                Assessment and Plan   Diagnoses and all orders for this visit:    1. Postpartum depression  -     citalopram (CeleXA) 40 MG tablet; Take 1 tablet by mouth Daily.  Dispense: 90 tablet; Refill: 2    Celexa increased to 40 mg.  Patient was given the phone number today for counseling services through [facility] if she would still like to schedule.  She was advised to follow-up with me in 1 year or sooner if needed.         Follow Up   Return in about 1 year (around 10/2/2025) for gynecological exam.  Patient was given " instructions and counseling regarding her condition or for health maintenance advice. Please see specific information pulled into the AVS if appropriate.

## 2024-11-21 ENCOUNTER — TELEPHONE (OUTPATIENT)
Dept: OBSTETRICS AND GYNECOLOGY | Facility: CLINIC | Age: 29
End: 2024-11-21

## 2024-11-21 NOTE — TELEPHONE ENCOUNTER
Hub staff attempted to follow warm transfer process and was unsuccessful     Caller: WILLIS ALEJANDRO    Relationship to patient: Emergency Contact    Best call back number: [phone]      Patient is needing: REQUESTING A SAME DAY APPT FOR A YEAST INFECTION. PT IS EXPERIENCING ITCHING. SHE HAS TRIED MONISTAT AND IT DIDN'T HELP

## 2025-03-03 RX ORDER — NORGESTIMATE AND ETHINYL ESTRADIOL 7DAYSX3 28
1 KIT ORAL DAILY
Qty: 84 TABLET | Refills: 3 | Status: SHIPPED | OUTPATIENT
Start: 2025-03-03 | End: 2026-03-03

## (undated) DEVICE — GLV SURG BIOGEL LTX PF 6 1/2

## (undated) DEVICE — SOL IRR H2O BTL 1000ML STRL

## (undated) DEVICE — SLV SCD CALF HEMOFORCE DVT THERP REPROC MD

## (undated) DEVICE — SUT MNCRYL PLS ANTIB UD 4/0 PS2 18IN

## (undated) DEVICE — SUT VIC 0 CT 36IN J958H

## (undated) DEVICE — ANTIBACTERIAL UNDYED BRAIDED (POLYGLACTIN 910), SYNTHETIC ABSORBABLE SUTURE: Brand: COATED VICRYL

## (undated) DEVICE — GLV SURG BIOGEL LTX PF 6

## (undated) DEVICE — ADHS SKIN PREMIERPRO EXOFIN TOPICAL HI/VISC .5ML